# Patient Record
Sex: FEMALE | Race: WHITE | NOT HISPANIC OR LATINO | Employment: OTHER | URBAN - METROPOLITAN AREA
[De-identification: names, ages, dates, MRNs, and addresses within clinical notes are randomized per-mention and may not be internally consistent; named-entity substitution may affect disease eponyms.]

---

## 2023-02-03 PROBLEM — R92.8 ABNORMAL MAMMOGRAM OF RIGHT BREAST: Status: ACTIVE | Noted: 2023-02-03

## 2023-02-03 PROBLEM — I10 HYPERTENSION: Status: ACTIVE | Noted: 2023-02-03

## 2023-02-03 PROBLEM — R94.31 ABNORMAL ELECTROCARDIOGRAPHY: Status: ACTIVE | Noted: 2023-02-03

## 2023-02-03 PROBLEM — E78.5 HYPERLIPIDEMIA: Status: ACTIVE | Noted: 2023-02-03

## 2023-02-03 PROBLEM — J20.9 BRONCHITIS, ACUTE: Status: ACTIVE | Noted: 2023-02-03

## 2023-02-03 PROBLEM — J01.90 ACUTE BACTERIAL SINUSITIS: Status: ACTIVE | Noted: 2023-02-03

## 2023-02-03 PROBLEM — M17.12 ARTHRITIS OF KNEE, LEFT: Status: ACTIVE | Noted: 2023-02-03

## 2023-02-03 PROBLEM — M25.572 LEFT ANKLE PAIN: Status: ACTIVE | Noted: 2023-02-03

## 2023-02-03 PROBLEM — R05.8 COUGH DUE TO ACE INHIBITOR: Status: ACTIVE | Noted: 2023-02-03

## 2023-02-03 PROBLEM — R00.1 BRADYCARDIA, SINUS: Status: ACTIVE | Noted: 2023-02-03

## 2023-02-03 PROBLEM — B96.89 ACUTE BACTERIAL SINUSITIS: Status: ACTIVE | Noted: 2023-02-03

## 2023-02-03 PROBLEM — M10.9 GOUT: Status: ACTIVE | Noted: 2023-02-03

## 2023-02-03 PROBLEM — I65.29 CAROTID STENOSIS: Status: ACTIVE | Noted: 2023-02-03

## 2023-02-03 PROBLEM — F41.9 ANXIETY: Status: ACTIVE | Noted: 2023-02-03

## 2023-02-03 PROBLEM — S82.852A CLOSED TRIMALLEOLAR FRACTURE OF LEFT ANKLE: Status: ACTIVE | Noted: 2023-02-03

## 2023-02-03 PROBLEM — M25.562 LEFT KNEE PAIN: Status: ACTIVE | Noted: 2023-02-03

## 2023-02-03 PROBLEM — E03.9 ADULT HYPOTHYROIDISM: Status: ACTIVE | Noted: 2023-02-03

## 2023-02-03 PROBLEM — R92.1 BREAST CALCIFICATION, RIGHT: Status: ACTIVE | Noted: 2023-02-03

## 2023-02-03 PROBLEM — L82.1 SEBORRHEIC KERATOSES: Status: ACTIVE | Noted: 2023-02-03

## 2023-02-03 PROBLEM — T46.4X5A COUGH DUE TO ACE INHIBITOR: Status: ACTIVE | Noted: 2023-02-03

## 2023-02-03 RX ORDER — LEVOTHYROXINE SODIUM 25 UG/1
1 TABLET ORAL DAILY
COMMUNITY
Start: 2014-11-26 | End: 2023-10-30

## 2023-02-03 RX ORDER — ALLOPURINOL 100 MG/1
2 TABLET ORAL DAILY
COMMUNITY
Start: 2014-08-04 | End: 2023-04-21

## 2023-02-03 RX ORDER — VERAPAMIL HYDROCHLORIDE 120 MG/1
1 TABLET, FILM COATED, EXTENDED RELEASE ORAL DAILY
COMMUNITY
Start: 2014-08-18 | End: 2023-09-25

## 2023-02-03 RX ORDER — ASPIRIN 81 MG/1
1 TABLET ORAL DAILY
COMMUNITY
End: 2024-01-30 | Stop reason: WASHOUT

## 2023-02-03 RX ORDER — ATENOLOL 50 MG/1
TABLET ORAL DAILY
COMMUNITY
Start: 2014-10-27 | End: 2023-08-04

## 2023-02-03 RX ORDER — ALPRAZOLAM 0.5 MG/1
1 TABLET ORAL
COMMUNITY
Start: 2014-10-27 | End: 2023-03-17 | Stop reason: SDUPTHER

## 2023-03-17 ENCOUNTER — OFFICE VISIT (OUTPATIENT)
Dept: PRIMARY CARE | Facility: CLINIC | Age: 83
End: 2023-03-17
Payer: MEDICARE

## 2023-03-17 VITALS
SYSTOLIC BLOOD PRESSURE: 120 MMHG | BODY MASS INDEX: 23.29 KG/M2 | WEIGHT: 139.8 LBS | DIASTOLIC BLOOD PRESSURE: 82 MMHG | HEIGHT: 65 IN | TEMPERATURE: 98.2 F

## 2023-03-17 DIAGNOSIS — E03.9 ADULT HYPOTHYROIDISM: ICD-10-CM

## 2023-03-17 DIAGNOSIS — F41.9 ANXIETY: Primary | ICD-10-CM

## 2023-03-17 DIAGNOSIS — I10 HYPERTENSION, UNSPECIFIED TYPE: ICD-10-CM

## 2023-03-17 PROCEDURE — 1036F TOBACCO NON-USER: CPT | Performed by: FAMILY MEDICINE

## 2023-03-17 PROCEDURE — 3074F SYST BP LT 130 MM HG: CPT | Performed by: FAMILY MEDICINE

## 2023-03-17 PROCEDURE — 1159F MED LIST DOCD IN RCRD: CPT | Performed by: FAMILY MEDICINE

## 2023-03-17 PROCEDURE — 3079F DIAST BP 80-89 MM HG: CPT | Performed by: FAMILY MEDICINE

## 2023-03-17 PROCEDURE — 99213 OFFICE O/P EST LOW 20 MIN: CPT | Performed by: FAMILY MEDICINE

## 2023-03-17 RX ORDER — HYDROCORTISONE 25 MG/G
OINTMENT TOPICAL 2 TIMES DAILY
COMMUNITY
Start: 2019-11-22 | End: 2023-12-05 | Stop reason: ALTCHOICE

## 2023-03-17 RX ORDER — ATORVASTATIN CALCIUM 20 MG/1
20 TABLET, FILM COATED ORAL DAILY
COMMUNITY
End: 2023-12-18 | Stop reason: WASHOUT

## 2023-03-17 RX ORDER — ALPRAZOLAM 0.5 MG/1
0.5 TABLET ORAL 2 TIMES DAILY
Qty: 60 TABLET | Refills: 2 | Status: SHIPPED | OUTPATIENT
Start: 2023-03-17 | End: 2023-06-19 | Stop reason: SDUPTHER

## 2023-03-17 RX ORDER — BETAMETHASONE DIPROPIONATE 0.5 MG/G
CREAM TOPICAL
COMMUNITY
Start: 2015-07-31 | End: 2023-12-05 | Stop reason: ALTCHOICE

## 2023-03-17 ASSESSMENT — ENCOUNTER SYMPTOMS
CONSTITUTIONAL NEGATIVE: 1
SLEEP DISTURBANCE: 1
NERVOUS/ANXIOUS: 1

## 2023-03-17 ASSESSMENT — PAIN SCALES - GENERAL: PAINLEVEL: 0-NO PAIN

## 2023-03-17 NOTE — PROGRESS NOTES
"Subjective   Patient ID: Giulia Dai is a 82 y.o. female who presents for Med Refill (Anxiety med refills).    Med Refill         Review of Systems   Constitutional: Negative.    Cardiovascular:         Dr Morrow   Genitourinary:         Seeing nephrology   Psychiatric/Behavioral:  Positive for sleep disturbance. The patient is nervous/anxious.        Objective   /82 (BP Location: Left arm)   Temp 36.8 °C (98.2 °F) (Temporal)   Ht 1.651 m (5' 5\")   Wt 63.4 kg (139 lb 12.8 oz)   BMI 23.26 kg/m²     Physical Exam  Vitals reviewed.   Cardiovascular:      Rate and Rhythm: Regular rhythm.   Pulmonary:      Breath sounds: Normal breath sounds.   Neurological:      General: No focal deficit present.   Psychiatric:         Mood and Affect: Mood normal.         Behavior: Behavior normal.         Thought Content: Thought content normal.         Judgment: Judgment normal.         Assessment/Plan   Problem List Items Addressed This Visit          Circulatory    Hypertension       Endocrine/Metabolic    Adult hypothyroidism       Other    Anxiety - Primary          "

## 2023-03-17 NOTE — PROGRESS NOTES
Subjective   Patient ID: Giulia Dai is a 82 y.o. female who presents for No chief complaint on file..   3 month med refills  HPI     Review of Systems    Objective   There were no vitals taken for this visit.    Physical Exam    Assessment/Plan   Problem List Items Addressed This Visit          Circulatory    Hypertension       Endocrine/Metabolic    Adult hypothyroidism       Other    Anxiety - Primary        OARRS:  No data recorded  I have personally reviewed the OARRS report for Giulia Dai. I have considered the risks of abuse, dependence, addiction and diversion    Is the patient prescribed a combination of a benzodiazepine and opioid?  Yes, I feel it is clincially indicated to continue the medication and have discussed with the patient risks/benefits/alternatives.    Last Urine Drug Screen / ordered today: Yes  Recent Results (from the past 46751 hour(s))   Benzodiazepine Confirmation, Urine    Collection Time: 03/11/22  1:10 PM   Result Value Ref Range    7-Aminoclonazepam <25 Cutoff <25 ng/mL    Alpha-Hydroxyalprazolam 180 (A) Cutoff <25 ng/mL    Alpha-Hydroxymidazolam <25 Cutoff <25 ng/mL    Alprazolam 36 (A) Cutoff <25 ng/mL    Chlordiazepoxide <25 Cutoff <25 ng/mL    Clonazepam <25 Cutoff <25 ng/mL    Diazepam <25 Cutoff <25 ng/mL    Lorazepam <25 Cutoff <25 ng/mL    Midazolam <25 Cutoff <25 ng/mL    Nordiazepam <25 Cutoff <25 ng/mL    Oxazepam <25 Cutoff <25 ng/mL    Temazepam <25 Cutoff <25 ng/mL   Drug Screen, Urine With Reflex to Confirmation    Collection Time: 03/11/22  1:10 PM   Result Value Ref Range    DRUG SCREEN COMMENT URINE SEE BELOW     Amphetamine Screen, Urine PRESUMPTIVE NEGATIVE NEGATIVE    Barbiturate Screen, Urine PRESUMPTIVE NEGATIVE NEGATIVE    BENZODIAZEPINE (PRESENCE) IN URINE BY SCREEN METHOD PRESUMPTIVE POSITIVE (A) NEGATIVE    Cannabinoid Screen, Urine PRESUMPTIVE NEGATIVE NEGATIVE    Cocaine Screen, Urine PRESUMPTIVE NEGATIVE NEGATIVE    Fentanyl, Ur PRESUMPTIVE  NEGATIVE NEGATIVE    Methadone Screen, Urine PRESUMPTIVE NEGATIVE NEGATIVE    Opiate Screen, Urine PRESUMPTIVE NEGATIVE NEGATIVE    Oxycodone Screen, Ur PRESUMPTIVE NEGATIVE NEGATIVE    PCP Screen, Urine PRESUMPTIVE NEGATIVE NEGATIVE     Results are as expected.     Controlled Substance Agreement:  Date of the Last Agreement: 3/11/22  Reviewed Controlled Substance Agreement including but not limited to the benefits, risks, and alternatives to treatment with a Controlled Substance medication(s).    Benzodiazepines:  What is the patient's goal of therapy? anxiety  Is this being achieved with current treatment? yes    SAL-7:  No data recorded    Activities of Daily Living:   Is your overall impression that this patient is benefiting (symptom reduction outweighs side effects) from benzodiazepine therapy? Yes     1. Physical Functioning: Better  2. Family Relationship: Better  3. Social Relationship: Better  4. Mood: Better  5. Sleep Patterns: Better  6. Overall Function: Better

## 2023-04-21 DIAGNOSIS — M10.9 GOUT, UNSPECIFIED: ICD-10-CM

## 2023-04-21 RX ORDER — ALLOPURINOL 100 MG/1
TABLET ORAL
Qty: 180 TABLET | Refills: 3 | Status: SHIPPED | OUTPATIENT
Start: 2023-04-21 | End: 2024-02-27

## 2023-06-19 ENCOUNTER — OFFICE VISIT (OUTPATIENT)
Dept: PRIMARY CARE | Facility: CLINIC | Age: 83
End: 2023-06-19
Payer: MEDICARE

## 2023-06-19 ENCOUNTER — LAB (OUTPATIENT)
Dept: LAB | Facility: LAB | Age: 83
End: 2023-06-19
Payer: MEDICARE

## 2023-06-19 VITALS
WEIGHT: 140 LBS | SYSTOLIC BLOOD PRESSURE: 120 MMHG | HEIGHT: 66 IN | TEMPERATURE: 97.1 F | DIASTOLIC BLOOD PRESSURE: 70 MMHG | BODY MASS INDEX: 22.5 KG/M2

## 2023-06-19 DIAGNOSIS — Z51.81 MEDICATION MONITORING ENCOUNTER: ICD-10-CM

## 2023-06-19 DIAGNOSIS — I10 HYPERTENSION, UNSPECIFIED TYPE: Primary | ICD-10-CM

## 2023-06-19 DIAGNOSIS — F41.9 ANXIETY: ICD-10-CM

## 2023-06-19 LAB
AMPHETAMINE (PRESENCE) IN URINE BY SCREEN METHOD: ABNORMAL
BARBITURATES PRESENCE IN URINE BY SCREEN METHOD: ABNORMAL
BENZODIAZEPINE (PRESENCE) IN URINE BY SCREEN METHOD: ABNORMAL
CANNABINOIDS IN URINE BY SCREEN METHOD: ABNORMAL
COCAINE (PRESENCE) IN URINE BY SCREEN METHOD: ABNORMAL
DRUG SCREEN COMMENT URINE: ABNORMAL
FENTANYL URINE: ABNORMAL
METHADONE (PRESENCE) IN URINE BY SCREEN METHOD: ABNORMAL
OPIATES (PRESENCE) IN URINE BY SCREEN METHOD: ABNORMAL
OXYCODONE (PRESENCE) IN URINE BY SCREEN METHOD: ABNORMAL
PHENCYCLIDINE (PRESENCE) IN URINE BY SCREEN METHOD: ABNORMAL

## 2023-06-19 PROCEDURE — 1160F RVW MEDS BY RX/DR IN RCRD: CPT | Performed by: FAMILY MEDICINE

## 2023-06-19 PROCEDURE — 99213 OFFICE O/P EST LOW 20 MIN: CPT | Performed by: FAMILY MEDICINE

## 2023-06-19 PROCEDURE — 1036F TOBACCO NON-USER: CPT | Performed by: FAMILY MEDICINE

## 2023-06-19 PROCEDURE — 3074F SYST BP LT 130 MM HG: CPT | Performed by: FAMILY MEDICINE

## 2023-06-19 PROCEDURE — 80307 DRUG TEST PRSMV CHEM ANLYZR: CPT

## 2023-06-19 PROCEDURE — 80346 BENZODIAZEPINES1-12: CPT

## 2023-06-19 PROCEDURE — 3078F DIAST BP <80 MM HG: CPT | Performed by: FAMILY MEDICINE

## 2023-06-19 PROCEDURE — 1159F MED LIST DOCD IN RCRD: CPT | Performed by: FAMILY MEDICINE

## 2023-06-19 RX ORDER — ALPRAZOLAM 0.5 MG/1
0.5 TABLET ORAL 2 TIMES DAILY
Qty: 60 TABLET | Refills: 2 | Status: SHIPPED | OUTPATIENT
Start: 2023-06-19 | End: 2023-09-18 | Stop reason: SDUPTHER

## 2023-06-19 ASSESSMENT — ENCOUNTER SYMPTOMS
LOSS OF SENSATION IN FEET: 0
CONSTITUTIONAL NEGATIVE: 1
NERVOUS/ANXIOUS: 1
DEPRESSION: 0
OCCASIONAL FEELINGS OF UNSTEADINESS: 0

## 2023-06-19 ASSESSMENT — PAIN SCALES - GENERAL: PAINLEVEL: 0-NO PAIN

## 2023-06-19 NOTE — PROGRESS NOTES
"Subjective   Patient ID: Giulia Dai is a 83 y.o. female who presents for Med Refill (Follow up anxiety med refills).   3 month med refills  HPI     Review of Systems   Constitutional: Negative.    Psychiatric/Behavioral:  The patient is nervous/anxious.        Objective   /70 (BP Location: Left arm)   Temp 36.2 °C (97.1 °F) (Temporal)   Ht 1.676 m (5' 6\")   Wt 63.5 kg (140 lb)   BMI 22.60 kg/m²     Physical Exam  Vitals and nursing note reviewed.   Neurological:      General: No focal deficit present.      Mental Status: She is alert and oriented to person, place, and time.   Psychiatric:         Behavior: Behavior normal.         Assessment/Plan   Problem List Items Addressed This Visit          Circulatory    Hypertension - Primary       Other    Anxiety    Relevant Medications    ALPRAZolam (Xanax) 0.5 mg tablet        OARRS:  Alvin Langston,  on 6/19/2023  2:17 PM  I have personally reviewed the OARRS report for Giulia Dai. I have considered the risks of abuse, dependence, addiction and diversion    Is the patient prescribed a combination of a benzodiazepine and opioid?  No    Last Urine Drug Screen / ordered today: Yes  Recent Results (from the past 50645 hour(s))   Benzodiazepine Confirmation, Urine    Collection Time: 03/11/22  1:10 PM   Result Value Ref Range    7-Aminoclonazepam <25 Cutoff <25 ng/mL    Alpha-Hydroxyalprazolam 180 (A) Cutoff <25 ng/mL    Alpha-Hydroxymidazolam <25 Cutoff <25 ng/mL    Alprazolam 36 (A) Cutoff <25 ng/mL    Chlordiazepoxide <25 Cutoff <25 ng/mL    Clonazepam <25 Cutoff <25 ng/mL    Diazepam <25 Cutoff <25 ng/mL    Lorazepam <25 Cutoff <25 ng/mL    Midazolam <25 Cutoff <25 ng/mL    Nordiazepam <25 Cutoff <25 ng/mL    Oxazepam <25 Cutoff <25 ng/mL    Temazepam <25 Cutoff <25 ng/mL   Drug Screen, Urine With Reflex to Confirmation    Collection Time: 03/11/22  1:10 PM   Result Value Ref Range    DRUG SCREEN COMMENT URINE SEE BELOW     Amphetamine " Screen, Urine PRESUMPTIVE NEGATIVE NEGATIVE    Barbiturate Screen, Urine PRESUMPTIVE NEGATIVE NEGATIVE    BENZODIAZEPINE (PRESENCE) IN URINE BY SCREEN METHOD PRESUMPTIVE POSITIVE (A) NEGATIVE    Cannabinoid Screen, Urine PRESUMPTIVE NEGATIVE NEGATIVE    Cocaine Screen, Urine PRESUMPTIVE NEGATIVE NEGATIVE    Fentanyl, Ur PRESUMPTIVE NEGATIVE NEGATIVE    Methadone Screen, Urine PRESUMPTIVE NEGATIVE NEGATIVE    Opiate Screen, Urine PRESUMPTIVE NEGATIVE NEGATIVE    Oxycodone Screen, Ur PRESUMPTIVE NEGATIVE NEGATIVE    PCP Screen, Urine PRESUMPTIVE NEGATIVE NEGATIVE     N/A    Controlled Substance Agreement:  Date of the Last Agreement: 16/19/23  Reviewed Controlled Substance Agreement including but not limited to the benefits, risks, and alternatives to treatment with a Controlled Substance medication(s).    Benzodiazepines:  What is the patient's goal of therapy? anxiety  Is this being achieved with current treatment? y    SAL-7:  No data recorded    Activities of Daily Living:   Is your overall impression that this patient is benefiting (symptom reduction outweighs side effects) from benzodiazepine therapy? Yes     1. Physical Functioning: Better  2. Family Relationship: Better  3. Social Relationship: Better  4. Mood: Better  5. Sleep Patterns: Better  6. Overall Function: Better

## 2023-06-22 LAB
7-AMINOCLONAZEPAM: <25 NG/ML
ALPHA-HYDROXYALPRAZOLAM: 568 NG/ML
ALPHA-HYDROXYMIDAZOLAM: <25 NG/ML
ALPRAZOLAM: 117 NG/ML
CHLORDIAZEPOXIDE: <25 NG/ML
CLONAZEPAM: <25 NG/ML
DIAZEPAM: <25 NG/ML
LORAZEPAM: <25 NG/ML
MIDAZOLAM: <25 NG/ML
NORDIAZEPAM: <25 NG/ML
OXAZEPAM: <25 NG/ML
TEMAZEPAM: <25 NG/ML

## 2023-07-28 PROBLEM — N18.32 STAGE 3B CHRONIC KIDNEY DISEASE (MULTI): Status: ACTIVE | Noted: 2023-07-28

## 2023-07-28 PROBLEM — N20.0 NEPHROLITHIASIS: Status: ACTIVE | Noted: 2023-07-28

## 2023-07-28 PROBLEM — N17.9 ACUTE RENAL FAILURE SYNDROME (CMS-HCC): Status: ACTIVE | Noted: 2023-07-28

## 2023-07-28 PROBLEM — N25.81 HYPERPARATHYROIDISM DUE TO RENAL INSUFFICIENCY (MULTI): Status: ACTIVE | Noted: 2023-07-28

## 2023-07-28 RX ORDER — VALSARTAN AND HYDROCHLOROTHIAZIDE 80; 12.5 MG/1; MG/1
1 TABLET, FILM COATED ORAL
COMMUNITY

## 2023-07-28 RX ORDER — HYDROCODONE BITARTRATE AND ACETAMINOPHEN 5; 325 MG/1; MG/1
1 TABLET ORAL EVERY 4 HOURS PRN
COMMUNITY
Start: 2014-07-31 | End: 2023-09-18 | Stop reason: ALTCHOICE

## 2023-07-28 RX ORDER — IBUPROFEN 200 MG
TABLET ORAL EVERY 4 HOURS PRN
COMMUNITY
Start: 2014-07-31 | End: 2023-12-05 | Stop reason: ALTCHOICE

## 2023-07-28 RX ORDER — COLCHICINE 0.6 MG/1
TABLET ORAL
COMMUNITY
Start: 2014-07-31 | End: 2023-12-05 | Stop reason: ALTCHOICE

## 2023-08-04 DIAGNOSIS — I10 ESSENTIAL (PRIMARY) HYPERTENSION: ICD-10-CM

## 2023-08-04 RX ORDER — ATENOLOL 50 MG/1
50 TABLET ORAL DAILY
Qty: 90 TABLET | Refills: 3 | Status: SHIPPED | OUTPATIENT
Start: 2023-08-04

## 2023-08-09 LAB
ALBUMIN (G/DL) IN SER/PLAS: 4 G/DL (ref 3.4–5)
ALBUMIN (MG/L) IN URINE: 8.6 MG/L
ALBUMIN/CREATININE (UG/MG) IN URINE: 20.2 UG/MG CRT (ref 0–30)
ANION GAP IN SER/PLAS: 11 MMOL/L (ref 10–20)
CALCIDIOL (25 OH VITAMIN D3) (NG/ML) IN SER/PLAS: 21 NG/ML
CALCIUM (MG/DL) IN SER/PLAS: 10.3 MG/DL (ref 8.6–10.6)
CARBON DIOXIDE, TOTAL (MMOL/L) IN SER/PLAS: 27 MMOL/L (ref 21–32)
CHLORIDE (MMOL/L) IN SER/PLAS: 101 MMOL/L (ref 98–107)
CREATININE (MG/DL) IN SER/PLAS: 1.04 MG/DL (ref 0.5–1.05)
CREATININE (MG/DL) IN URINE: 42.6 MG/DL (ref 20–320)
GFR FEMALE: 53 ML/MIN/1.73M2
GLUCOSE (MG/DL) IN SER/PLAS: 84 MG/DL (ref 74–99)
PARATHYRIN INTACT (PG/ML) IN SER/PLAS: 125.7 PG/ML (ref 18.5–88)
PHOSPHATE (MG/DL) IN SER/PLAS: 3.7 MG/DL (ref 2.5–4.9)
POTASSIUM (MMOL/L) IN SER/PLAS: 4.1 MMOL/L (ref 3.5–5.3)
SODIUM (MMOL/L) IN SER/PLAS: 135 MMOL/L (ref 136–145)
UREA NITROGEN (MG/DL) IN SER/PLAS: 17 MG/DL (ref 6–23)

## 2023-09-18 ENCOUNTER — OFFICE VISIT (OUTPATIENT)
Dept: PRIMARY CARE | Facility: CLINIC | Age: 83
End: 2023-09-18
Payer: MEDICARE

## 2023-09-18 VITALS
HEIGHT: 66 IN | HEART RATE: 58 BPM | SYSTOLIC BLOOD PRESSURE: 142 MMHG | DIASTOLIC BLOOD PRESSURE: 74 MMHG | TEMPERATURE: 97.7 F | BODY MASS INDEX: 22.43 KG/M2 | WEIGHT: 139.6 LBS | OXYGEN SATURATION: 91 %

## 2023-09-18 DIAGNOSIS — I10 HYPERTENSION, UNSPECIFIED TYPE: Primary | ICD-10-CM

## 2023-09-18 DIAGNOSIS — N18.32 STAGE 3B CHRONIC KIDNEY DISEASE (MULTI): ICD-10-CM

## 2023-09-18 DIAGNOSIS — F41.9 ANXIETY: ICD-10-CM

## 2023-09-18 DIAGNOSIS — Z12.31 SCREENING MAMMOGRAM FOR BREAST CANCER: ICD-10-CM

## 2023-09-18 DIAGNOSIS — E03.9 ADULT HYPOTHYROIDISM: ICD-10-CM

## 2023-09-18 PROCEDURE — 3077F SYST BP >= 140 MM HG: CPT | Performed by: FAMILY MEDICINE

## 2023-09-18 PROCEDURE — 1036F TOBACCO NON-USER: CPT | Performed by: FAMILY MEDICINE

## 2023-09-18 PROCEDURE — 90662 IIV NO PRSV INCREASED AG IM: CPT | Performed by: FAMILY MEDICINE

## 2023-09-18 PROCEDURE — 3078F DIAST BP <80 MM HG: CPT | Performed by: FAMILY MEDICINE

## 2023-09-18 PROCEDURE — 99213 OFFICE O/P EST LOW 20 MIN: CPT | Performed by: FAMILY MEDICINE

## 2023-09-18 PROCEDURE — G0008 ADMIN INFLUENZA VIRUS VAC: HCPCS | Performed by: FAMILY MEDICINE

## 2023-09-18 PROCEDURE — 1160F RVW MEDS BY RX/DR IN RCRD: CPT | Performed by: FAMILY MEDICINE

## 2023-09-18 PROCEDURE — 1159F MED LIST DOCD IN RCRD: CPT | Performed by: FAMILY MEDICINE

## 2023-09-18 PROCEDURE — 1126F AMNT PAIN NOTED NONE PRSNT: CPT | Performed by: FAMILY MEDICINE

## 2023-09-18 RX ORDER — ALPRAZOLAM 0.5 MG/1
0.5 TABLET ORAL 2 TIMES DAILY
Qty: 60 TABLET | Refills: 2 | Status: SHIPPED | OUTPATIENT
Start: 2023-09-18 | End: 2023-12-18 | Stop reason: SDUPTHER

## 2023-09-18 SDOH — ECONOMIC STABILITY: FOOD INSECURITY: WITHIN THE PAST 12 MONTHS, THE FOOD YOU BOUGHT JUST DIDN'T LAST AND YOU DIDN'T HAVE MONEY TO GET MORE.: NEVER TRUE

## 2023-09-18 SDOH — ECONOMIC STABILITY: HOUSING INSECURITY
IN THE LAST 12 MONTHS, WAS THERE A TIME WHEN YOU DID NOT HAVE A STEADY PLACE TO SLEEP OR SLEPT IN A SHELTER (INCLUDING NOW)?: NO

## 2023-09-18 SDOH — ECONOMIC STABILITY: INCOME INSECURITY: IN THE LAST 12 MONTHS, WAS THERE A TIME WHEN YOU WERE NOT ABLE TO PAY THE MORTGAGE OR RENT ON TIME?: NO

## 2023-09-18 SDOH — ECONOMIC STABILITY: TRANSPORTATION INSECURITY
IN THE PAST 12 MONTHS, HAS THE LACK OF TRANSPORTATION KEPT YOU FROM MEDICAL APPOINTMENTS OR FROM GETTING MEDICATIONS?: NO

## 2023-09-18 SDOH — ECONOMIC STABILITY: TRANSPORTATION INSECURITY
IN THE PAST 12 MONTHS, HAS LACK OF TRANSPORTATION KEPT YOU FROM MEETINGS, WORK, OR FROM GETTING THINGS NEEDED FOR DAILY LIVING?: NO

## 2023-09-18 SDOH — ECONOMIC STABILITY: FOOD INSECURITY: WITHIN THE PAST 12 MONTHS, YOU WORRIED THAT YOUR FOOD WOULD RUN OUT BEFORE YOU GOT MONEY TO BUY MORE.: NEVER TRUE

## 2023-09-18 ASSESSMENT — LIFESTYLE VARIABLES
HOW MANY STANDARD DRINKS CONTAINING ALCOHOL DO YOU HAVE ON A TYPICAL DAY: PATIENT DOES NOT DRINK
SKIP TO QUESTIONS 9-10: 1
HOW OFTEN DO YOU HAVE SIX OR MORE DRINKS ON ONE OCCASION: NEVER
HOW OFTEN DO YOU HAVE A DRINK CONTAINING ALCOHOL: NEVER
AUDIT-C TOTAL SCORE: 0

## 2023-09-18 ASSESSMENT — SOCIAL DETERMINANTS OF HEALTH (SDOH)
IN THE PAST 12 MONTHS, HAS THE ELECTRIC, GAS, OIL, OR WATER COMPANY THREATENED TO SHUT OFF SERVICE IN YOUR HOME?: NO
WITHIN THE LAST YEAR, HAVE YOU BEEN HUMILIATED OR EMOTIONALLY ABUSED IN OTHER WAYS BY YOUR PARTNER OR EX-PARTNER?: NO
HOW HARD IS IT FOR YOU TO PAY FOR THE VERY BASICS LIKE FOOD, HOUSING, MEDICAL CARE, AND HEATING?: NOT HARD AT ALL
WITHIN THE LAST YEAR, HAVE YOU BEEN KICKED, HIT, SLAPPED, OR OTHERWISE PHYSICALLY HURT BY YOUR PARTNER OR EX-PARTNER?: NO
WITHIN THE LAST YEAR, HAVE YOU BEEN AFRAID OF YOUR PARTNER OR EX-PARTNER?: NO
WITHIN THE LAST YEAR, HAVE TO BEEN RAPED OR FORCED TO HAVE ANY KIND OF SEXUAL ACTIVITY BY YOUR PARTNER OR EX-PARTNER?: NO

## 2023-09-18 ASSESSMENT — ENCOUNTER SYMPTOMS
DEPRESSION: 0
LOSS OF SENSATION IN FEET: 0
OCCASIONAL FEELINGS OF UNSTEADINESS: 0
NERVOUS/ANXIOUS: 1
CONSTITUTIONAL NEGATIVE: 1

## 2023-09-18 ASSESSMENT — PATIENT HEALTH QUESTIONNAIRE - PHQ9
1. LITTLE INTEREST OR PLEASURE IN DOING THINGS: NOT AT ALL
SUM OF ALL RESPONSES TO PHQ9 QUESTIONS 1 & 2: 0
2. FEELING DOWN, DEPRESSED OR HOPELESS: NOT AT ALL

## 2023-09-18 ASSESSMENT — PAIN SCALES - GENERAL: PAINLEVEL: 0-NO PAIN

## 2023-09-18 NOTE — PROGRESS NOTES
"Subjective   Patient ID: Giulia Dai is a 83 y.o. female who presents for Follow-up (Follow up anxiety med refills).   3 month med refills  HPI     Review of Systems   Constitutional: Negative.    Psychiatric/Behavioral:  The patient is nervous/anxious.        Objective   /74 (BP Location: Left arm)   Pulse 58   Temp 36.5 °C (97.7 °F) (Temporal)   Ht 1.676 m (5' 6\")   Wt 63.3 kg (139 lb 9.6 oz)   SpO2 91%   BMI 22.53 kg/m²     Physical Exam  Vitals and nursing note reviewed.   Neurological:      Mental Status: She is oriented to person, place, and time.   Psychiatric:         Mood and Affect: Mood normal.         Behavior: Behavior normal.         Assessment/Plan patient seen here for follow-up on her anxiety medication.  We refilled her meds.  We are also giving her flu vaccine and requisition for her mammogram.  Problem List Items Addressed This Visit       Adult hypothyroidism    Anxiety    Relevant Medications    ALPRAZolam (Xanax) 0.5 mg tablet    Hypertension - Primary    Stage 3b chronic kidney disease (CMS/HCC)     Other Visit Diagnoses       Screening mammogram for breast cancer        Relevant Orders    BI mammo bilateral screening tomosynthesis             OARRS:  Alvin Langston DO on 9/18/2023 12:59 PM  I have personally reviewed the OARRS report for Giulia Dai. I have considered the risks of abuse, dependence, addiction and diversion    Is the patient prescribed a combination of a benzodiazepine and opioid?  No    Last Urine Drug Screen / ordered today: No  Recent Results (from the past 8760 hour(s))   Benzodiazepine Confirmation, Urine    Collection Time: 06/19/23  2:36 PM   Result Value Ref Range    7-Aminoclonazepam <25 Cutoff <25 ng/mL    Alpha-Hydroxyalprazolam 568 (A) Cutoff <25 ng/mL    Alpha-Hydroxymidazolam <25 Cutoff <25 ng/mL    Alprazolam 117 (A) Cutoff <25 ng/mL    Chlordiazepoxide <25 Cutoff <25 ng/mL    Clonazepam <25 Cutoff <25 ng/mL    Diazepam <25 Cutoff " <25 ng/mL    Lorazepam <25 Cutoff <25 ng/mL    Midazolam <25 Cutoff <25 ng/mL    Nordiazepam <25 Cutoff <25 ng/mL    Oxazepam <25 Cutoff <25 ng/mL    Temazepam <25 Cutoff <25 ng/mL   Drug Screen, Urine With Reflex to Confirmation    Collection Time: 06/19/23  2:36 PM   Result Value Ref Range    DRUG SCREEN COMMENT URINE SEE BELOW     Amphetamine Screen, Urine PRESUMPTIVE NEGATIVE NEGATIVE    Barbiturate Screen, Urine PRESUMPTIVE NEGATIVE NEGATIVE    BENZODIAZEPINE (PRESENCE) IN URINE BY SCREEN METHOD PRESUMPTIVE POSITIVE (A) NEGATIVE    Cannabinoid Screen, Urine PRESUMPTIVE NEGATIVE NEGATIVE    Cocaine Screen, Urine PRESUMPTIVE NEGATIVE NEGATIVE    Fentanyl, Ur PRESUMPTIVE NEGATIVE NEGATIVE    Methadone Screen, Urine PRESUMPTIVE NEGATIVE NEGATIVE    Opiate Screen, Urine PRESUMPTIVE NEGATIVE NEGATIVE    Oxycodone Screen, Ur PRESUMPTIVE NEGATIVE NEGATIVE    PCP Screen, Urine PRESUMPTIVE NEGATIVE NEGATIVE     Results are as expected. yes  Controlled Substance Agreement:  Date of the Last Agreement: 6/19/23  Reviewed Controlled Substance Agreement including but not limited to the benefits, risks, and alternatives to treatment with a Controlled Substance medication(s).    Benzodiazepines:  What is the patient's goal of therapy? anxiety  Is this being achieved with current treatment? yes    SAL-7:  No data recorded    Activities of Daily Living:   Is your overall impression that this patient is benefiting (symptom reduction outweighs side effects) from benzodiazepine therapy? Yes     1. Physical Functioning: Better  2. Family Relationship: Better  3. Social Relationship: Better  4. Mood: Better  5. Sleep Patterns: Better  6. Overall Function: Better

## 2023-09-25 DIAGNOSIS — I10 ESSENTIAL (PRIMARY) HYPERTENSION: ICD-10-CM

## 2023-09-25 RX ORDER — VERAPAMIL HYDROCHLORIDE 120 MG/1
120 TABLET, FILM COATED, EXTENDED RELEASE ORAL DAILY
Qty: 90 TABLET | Refills: 2 | Status: SHIPPED | OUTPATIENT
Start: 2023-09-25

## 2023-10-02 ENCOUNTER — APPOINTMENT (OUTPATIENT)
Dept: RADIOLOGY | Facility: CLINIC | Age: 83
End: 2023-10-02
Payer: MEDICARE

## 2023-10-30 DIAGNOSIS — E03.9 HYPOTHYROIDISM, UNSPECIFIED: ICD-10-CM

## 2023-10-30 RX ORDER — LEVOTHYROXINE SODIUM 25 UG/1
25 TABLET ORAL DAILY
Qty: 90 TABLET | Refills: 3 | Status: SHIPPED | OUTPATIENT
Start: 2023-10-30

## 2023-12-04 PROBLEM — Z87.448 HISTORY OF RENAL INSUFFICIENCY SYNDROME: Status: ACTIVE | Noted: 2023-07-28

## 2023-12-05 ENCOUNTER — HOSPITAL ENCOUNTER (OUTPATIENT)
Dept: CARDIOLOGY | Facility: CLINIC | Age: 83
Discharge: HOME | End: 2023-12-05
Payer: MEDICARE

## 2023-12-05 ENCOUNTER — OFFICE VISIT (OUTPATIENT)
Dept: CARDIOLOGY | Facility: CLINIC | Age: 83
End: 2023-12-05
Payer: MEDICARE

## 2023-12-05 VITALS
OXYGEN SATURATION: 96 % | SYSTOLIC BLOOD PRESSURE: 155 MMHG | DIASTOLIC BLOOD PRESSURE: 95 MMHG | BODY MASS INDEX: 22.89 KG/M2 | HEART RATE: 98 BPM | WEIGHT: 141.8 LBS

## 2023-12-05 DIAGNOSIS — I47.19 ATRIAL TACHYCARDIA (CMS-HCC): ICD-10-CM

## 2023-12-05 DIAGNOSIS — Z87.448 HISTORY OF RENAL INSUFFICIENCY SYNDROME: ICD-10-CM

## 2023-12-05 DIAGNOSIS — E78.00 PURE HYPERCHOLESTEROLEMIA: ICD-10-CM

## 2023-12-05 DIAGNOSIS — I65.29 STENOSIS OF CAROTID ARTERY, UNSPECIFIED LATERALITY: ICD-10-CM

## 2023-12-05 DIAGNOSIS — I10 HYPERTENSION, UNSPECIFIED TYPE: Primary | ICD-10-CM

## 2023-12-05 PROCEDURE — 3077F SYST BP >= 140 MM HG: CPT | Performed by: INTERNAL MEDICINE

## 2023-12-05 PROCEDURE — 1159F MED LIST DOCD IN RCRD: CPT | Performed by: INTERNAL MEDICINE

## 2023-12-05 PROCEDURE — 93000 ELECTROCARDIOGRAM COMPLETE: CPT | Performed by: INTERNAL MEDICINE

## 2023-12-05 PROCEDURE — 1126F AMNT PAIN NOTED NONE PRSNT: CPT | Performed by: INTERNAL MEDICINE

## 2023-12-05 PROCEDURE — 99214 OFFICE O/P EST MOD 30 MIN: CPT | Performed by: INTERNAL MEDICINE

## 2023-12-05 PROCEDURE — 1160F RVW MEDS BY RX/DR IN RCRD: CPT | Performed by: INTERNAL MEDICINE

## 2023-12-05 PROCEDURE — 3080F DIAST BP >= 90 MM HG: CPT | Performed by: INTERNAL MEDICINE

## 2023-12-05 PROCEDURE — 1036F TOBACCO NON-USER: CPT | Performed by: INTERNAL MEDICINE

## 2023-12-05 NOTE — PROGRESS NOTES
Chief Complaint:   Follow-up (6 month/Current BP on watch at visit is 128/75 HR 65)     History Of Present Illness:    Giulia Dai is a 83 y.o. female presenting with CV dz.  Monitoring BP/pulse at home-normally good  No palpitations    Patient denies chest pain/SOB/dizziness/lightheadedness/edema/claudication    Active around the home           Last Recorded Vitals:  Vitals:    12/05/23 1328 12/05/23 1351   BP: (!) 156/100 (!) 155/95   BP Location: Left arm    Patient Position: Sitting    Pulse: 98    SpO2: 96%    Weight: 64.3 kg (141 lb 12.8 oz)             Allergies:  Patient has no known allergies.    Outpatient Medications:  Current Outpatient Medications   Medication Instructions    allopurinol (Zyloprim) 100 mg tablet TAKE 2 TABLETS DAILY    ALPRAZolam (XANAX) 0.5 mg, oral, 2 times daily, 2 times daily PRN    aspirin 81 mg EC tablet 1 tablet, oral, Daily    atenolol (TENORMIN) 50 mg, oral, Daily    atorvastatin (LIPITOR) 20 mg, oral, Daily    levothyroxine (SYNTHROID, LEVOXYL) 25 mcg, oral, Daily    valsartan-hydrochlorothiazide (Diovan-HCT) 80-12.5 mg tablet 1 tablet, oral, Daily RT    verapamil SR (CALAN-SR) 120 mg, oral, Daily       Physical Exam:  Constitutional:       General: Awake.      Appearance: Healthy appearance. Not in distress.   Neck:      Vascular: No JVR. JVD normal.   Pulmonary:      Effort: Pulmonary effort is normal.      Breath sounds: Normal breath sounds. No wheezing. No rhonchi. No rales.   Chest:      Chest wall: Not tender to palpatation.   Cardiovascular:      PMI at left midclavicular line. Normal rate. Occasional ectopic beats. Regular rhythm. Normal S1. Normal S2.       Murmurs: There is no murmur.      No gallop.  No click. No rub.   Pulses:     Intact distal pulses.   Edema:     Peripheral edema absent.   Abdominal:      General: Bowel sounds are normal.      Palpations: Abdomen is soft.      Tenderness: There is no abdominal tenderness.   Musculoskeletal: Normal range of  "motion.         General: No tenderness. Skin:     General: Skin is warm and dry.   Neurological:      General: No focal deficit present.      Mental Status: Alert and oriented to person, place and time.          Last Labs:  CBC -  Lab Results   Component Value Date    WBC 8.6 02/07/2022    HGB 11.6 (L) 02/07/2022    HCT 36.1 02/07/2022    MCV 96 02/07/2022     02/07/2022       CMP -  Lab Results   Component Value Date    CALCIUM 10.3 08/09/2023    PHOS 3.7 08/09/2023    PROT 6.9 11/23/2021    ALBUMIN 4.0 08/09/2023    AST 17 11/23/2021    ALT 13 11/23/2021    ALKPHOS 126 11/23/2021    BILITOT 0.8 11/23/2021       LIPID PANEL -   Lab Results   Component Value Date    CHOL 119 11/23/2021    TRIG 132 11/23/2021    HDL 31.4 (A) 11/23/2021    CHHDL 3.8 11/23/2021    LDLF 61 11/23/2021    VLDL 26 11/23/2021    NHDL 125 11/07/2018       RENAL FUNCTION PANEL -   Lab Results   Component Value Date    GLUCOSE 84 08/09/2023     (L) 08/09/2023    K 4.1 08/09/2023     08/09/2023    CO2 27 08/09/2023    ANIONGAP 11 08/09/2023    BUN 17 08/09/2023    CREATININE 1.04 08/09/2023    CALCIUM 10.3 08/09/2023    PHOS 3.7 08/09/2023    ALBUMIN 4.0 08/09/2023        No results found for: \"BNP\", \"HGBA1C\"        Lab review: I have personally reviewed the laboratory result(s)       Problem List Items Addressed This Visit       Carotid stenosis    Overview     Pt with mild dz per 2012 duplex. 2/2019 duplex with less than 50% stenosis. No bruit. Follow.         Hyperlipidemia    Overview     Statin Dc'd by Renal. No definite indication for one at this time.         Hypertension - Primary    Overview     BP elevated in the office but generally OK at home. No medication changes made.   Note: SBP does not go above 130 at home. Suspect she has WC HTN.         Relevant Orders    ECG 12 Lead (Completed)    History of renal insufficiency syndrome    Overview     11/23/2021 Cr=3.68   12/2020 Cr=1.65   Stopped valsartan-HCTZ   2/2022 " "Cr=1   8/2022 Cr=1.07   8/2023 Cr=1.04         Atrial tachycardia    Overview     2:1 atrial tach noted on EKG today-asymptomatic  Pt regularly checks pulse at home and it is \"Ok\"  Check EM              Holter monitor=48 hours=atrial tachycardia        Colt Morrow, DO  "

## 2023-12-06 ENCOUNTER — ANCILLARY PROCEDURE (OUTPATIENT)
Dept: RADIOLOGY | Facility: CLINIC | Age: 83
End: 2023-12-06
Payer: MEDICARE

## 2023-12-06 DIAGNOSIS — Z12.31 SCREENING MAMMOGRAM FOR BREAST CANCER: ICD-10-CM

## 2023-12-06 PROCEDURE — 77063 BREAST TOMOSYNTHESIS BI: CPT

## 2023-12-06 PROCEDURE — 77067 SCR MAMMO BI INCL CAD: CPT

## 2023-12-06 PROCEDURE — 77067 SCR MAMMO BI INCL CAD: CPT | Mod: BILATERAL PROCEDURE | Performed by: STUDENT IN AN ORGANIZED HEALTH CARE EDUCATION/TRAINING PROGRAM

## 2023-12-06 PROCEDURE — 77063 BREAST TOMOSYNTHESIS BI: CPT | Mod: BILATERAL PROCEDURE | Performed by: STUDENT IN AN ORGANIZED HEALTH CARE EDUCATION/TRAINING PROGRAM

## 2023-12-11 ENCOUNTER — HOSPITAL ENCOUNTER (OUTPATIENT)
Dept: CARDIOLOGY | Facility: CLINIC | Age: 83
Discharge: HOME | End: 2023-12-11
Payer: MEDICARE

## 2023-12-11 PROCEDURE — 93225 XTRNL ECG REC<48 HRS REC: CPT

## 2023-12-11 PROCEDURE — 93227 XTRNL ECG REC<48 HR R&I: CPT | Performed by: INTERNAL MEDICINE

## 2023-12-12 DIAGNOSIS — R92.8 ABNORMALITY OF LEFT BREAST ON SCREENING MAMMOGRAM: Primary | ICD-10-CM

## 2023-12-18 ENCOUNTER — OFFICE VISIT (OUTPATIENT)
Dept: PRIMARY CARE | Facility: CLINIC | Age: 83
End: 2023-12-18
Payer: MEDICARE

## 2023-12-18 VITALS
BODY MASS INDEX: 22.21 KG/M2 | HEIGHT: 66 IN | DIASTOLIC BLOOD PRESSURE: 84 MMHG | SYSTOLIC BLOOD PRESSURE: 140 MMHG | TEMPERATURE: 97.8 F | WEIGHT: 138.2 LBS

## 2023-12-18 DIAGNOSIS — E78.5 HYPERLIPIDEMIA, UNSPECIFIED HYPERLIPIDEMIA TYPE: Primary | ICD-10-CM

## 2023-12-18 DIAGNOSIS — N18.32 STAGE 3B CHRONIC KIDNEY DISEASE (MULTI): ICD-10-CM

## 2023-12-18 DIAGNOSIS — F41.9 ANXIETY: ICD-10-CM

## 2023-12-18 DIAGNOSIS — I10 HYPERTENSION, UNSPECIFIED TYPE: ICD-10-CM

## 2023-12-18 PROCEDURE — 3079F DIAST BP 80-89 MM HG: CPT | Performed by: FAMILY MEDICINE

## 2023-12-18 PROCEDURE — 1036F TOBACCO NON-USER: CPT | Performed by: FAMILY MEDICINE

## 2023-12-18 PROCEDURE — 1159F MED LIST DOCD IN RCRD: CPT | Performed by: FAMILY MEDICINE

## 2023-12-18 PROCEDURE — 3077F SYST BP >= 140 MM HG: CPT | Performed by: FAMILY MEDICINE

## 2023-12-18 PROCEDURE — 99213 OFFICE O/P EST LOW 20 MIN: CPT | Performed by: FAMILY MEDICINE

## 2023-12-18 PROCEDURE — 1126F AMNT PAIN NOTED NONE PRSNT: CPT | Performed by: FAMILY MEDICINE

## 2023-12-18 PROCEDURE — 1160F RVW MEDS BY RX/DR IN RCRD: CPT | Performed by: FAMILY MEDICINE

## 2023-12-18 RX ORDER — ALPRAZOLAM 0.5 MG/1
0.5 TABLET ORAL 2 TIMES DAILY
Qty: 60 TABLET | Refills: 2 | Status: SHIPPED | OUTPATIENT
Start: 2023-12-18 | End: 2024-03-18 | Stop reason: SDUPTHER

## 2023-12-18 ASSESSMENT — ENCOUNTER SYMPTOMS
LOSS OF SENSATION IN FEET: 0
CARDIOVASCULAR NEGATIVE: 1
DEPRESSION: 0
CONSTITUTIONAL NEGATIVE: 1
OCCASIONAL FEELINGS OF UNSTEADINESS: 0
ARTHRALGIAS: 1
NERVOUS/ANXIOUS: 1

## 2023-12-18 ASSESSMENT — PAIN SCALES - GENERAL: PAINLEVEL: 0-NO PAIN

## 2023-12-18 NOTE — PROGRESS NOTES
"Anticipated Discharge Disposition: Home to [facility].    Action: This RN CM spoke with the patient about having and paying for personal care assistants through [employer] at $29.00 per hour.  The patient refuses to have them or pay for them, saying \"I just want to go home.  I can take care of myself.\"    Barriers to Discharge: The worry that the patient will fail at home or that he won't be safe without help and/or supervision.    Plan: Advise hospitalist during rounds today of the speech therapy cognitive eval done on 5/3 and ask if another speech therapy cognitive eval should be done or if psych will need to determine if the patient has capacity to make his own decisions.  " "Subjective   Patient ID: Giulia Dai is a 83 y.o. female who presents for Follow-up (3  month follow up meds).    HPI     Review of Systems   Constitutional: Negative.    Cardiovascular: Negative.    Musculoskeletal:  Positive for arthralgias.   Psychiatric/Behavioral:  The patient is nervous/anxious.        Objective   /84 (BP Location: Left arm)   Temp 36.6 °C (97.8 °F) (Temporal)   Ht 1.676 m (5' 6\")   Wt 62.7 kg (138 lb 3.2 oz)   BMI 22.31 kg/m²     Physical Exam  Vitals and nursing note reviewed.   Cardiovascular:      Rate and Rhythm: Regular rhythm.      Heart sounds: Normal heart sounds.   Pulmonary:      Breath sounds: Normal breath sounds.   Neurological:      General: No focal deficit present.      Mental Status: She is oriented to person, place, and time.   Psychiatric:         Mood and Affect: Mood normal.         Behavior: Behavior normal.       Assessment/Plan Patient seen for refill xanax she continues to do well. We will see her in 3 mos   Problem List Items Addressed This Visit             ICD-10-CM    Anxiety F41.9    Relevant Medications    ALPRAZolam (Xanax) 0.5 mg tablet    Hyperlipidemia - Primary E78.5    Hypertension I10    Stage 3b chronic kidney disease (CMS/HCC) N18.32          "

## 2023-12-26 ENCOUNTER — TELEPHONE (OUTPATIENT)
Dept: CARDIOLOGY | Facility: CLINIC | Age: 83
End: 2023-12-26
Payer: MEDICARE

## 2023-12-26 DIAGNOSIS — I48.91 ATRIAL FIBRILLATION, UNSPECIFIED TYPE (MULTI): ICD-10-CM

## 2023-12-26 NOTE — TELEPHONE ENCOUNTER
----- Message from Colt Morrow DO sent at 12/26/2023 10:41 AM EST -----  Regarding: FW:  Monitor with AFib/flutter-start Eliquis 5 mg twice daily and see soon  ----- Message -----  From: James C Ramicone, DO  Sent: 12/24/2023   7:15 AM EST  To: Colt Morrow DO

## 2024-01-16 ENCOUNTER — NURSE TRIAGE (OUTPATIENT)
Dept: PRIMARY CARE | Facility: CLINIC | Age: 84
End: 2024-01-16
Payer: MEDICARE

## 2024-01-30 ENCOUNTER — OFFICE VISIT (OUTPATIENT)
Dept: CARDIOLOGY | Facility: CLINIC | Age: 84
End: 2024-01-30
Payer: MEDICARE

## 2024-01-30 VITALS — WEIGHT: 140 LBS | DIASTOLIC BLOOD PRESSURE: 64 MMHG | BODY MASS INDEX: 22.6 KG/M2 | SYSTOLIC BLOOD PRESSURE: 128 MMHG

## 2024-01-30 DIAGNOSIS — R00.1 BRADYCARDIA, SINUS: Primary | ICD-10-CM

## 2024-01-30 DIAGNOSIS — I10 HYPERTENSION, UNSPECIFIED TYPE: ICD-10-CM

## 2024-01-30 DIAGNOSIS — E78.00 PURE HYPERCHOLESTEROLEMIA: ICD-10-CM

## 2024-01-30 DIAGNOSIS — Z87.448 HISTORY OF RENAL INSUFFICIENCY SYNDROME: ICD-10-CM

## 2024-01-30 DIAGNOSIS — I47.19 ATRIAL TACHYCARDIA (CMS-HCC): ICD-10-CM

## 2024-01-30 DIAGNOSIS — I48.0 PAROXYSMAL A-FIB (MULTI): ICD-10-CM

## 2024-01-30 DIAGNOSIS — I65.29 STENOSIS OF CAROTID ARTERY, UNSPECIFIED LATERALITY: ICD-10-CM

## 2024-01-30 PROCEDURE — 3078F DIAST BP <80 MM HG: CPT | Performed by: INTERNAL MEDICINE

## 2024-01-30 PROCEDURE — 99214 OFFICE O/P EST MOD 30 MIN: CPT | Performed by: INTERNAL MEDICINE

## 2024-01-30 PROCEDURE — 1126F AMNT PAIN NOTED NONE PRSNT: CPT | Performed by: INTERNAL MEDICINE

## 2024-01-30 PROCEDURE — 1159F MED LIST DOCD IN RCRD: CPT | Performed by: INTERNAL MEDICINE

## 2024-01-30 PROCEDURE — 1160F RVW MEDS BY RX/DR IN RCRD: CPT | Performed by: INTERNAL MEDICINE

## 2024-01-30 PROCEDURE — 3074F SYST BP LT 130 MM HG: CPT | Performed by: INTERNAL MEDICINE

## 2024-01-30 PROCEDURE — 1036F TOBACCO NON-USER: CPT | Performed by: INTERNAL MEDICINE

## 2024-01-30 PROCEDURE — 93000 ELECTROCARDIOGRAM COMPLETE: CPT | Performed by: INTERNAL MEDICINE

## 2024-01-30 NOTE — PROGRESS NOTES
Chief Complaint:   Follow-up     History Of Present Illness:    Giulia Dai is a 83 y.o. female presenting with CV dz.  Had EM since last visit  Feels well    Patient denies chest pain/SOB/dizziness/lightheadedness/edema/claudication    Active around the home  No bleeding with Eliquis  Thinks she has blurred vision with Eliquis           Last Recorded Vitals:  Vitals:    01/30/24 1521 01/30/24 1546   BP: 118/84 128/64   BP Location: Right arm    Patient Position: Sitting    BP Cuff Size: Adult    Weight: 63.5 kg (140 lb)             Allergies:  Patient has no known allergies.    Outpatient Medications:  Current Outpatient Medications   Medication Instructions    allopurinol (Zyloprim) 100 mg tablet TAKE 2 TABLETS DAILY    ALPRAZolam (XANAX) 0.5 mg, oral, 2 times daily, 2 times daily PRN    atenolol (TENORMIN) 50 mg, oral, Daily    levothyroxine (SYNTHROID, LEVOXYL) 25 mcg, oral, Daily    valsartan-hydrochlorothiazide (Diovan-HCT) 80-12.5 mg tablet 1 tablet, oral, Daily RT    verapamil SR (CALAN-SR) 120 mg, oral, Daily       Physical Exam:  Constitutional:       General: Awake.      Appearance: Healthy appearance. Not in distress.   Neck:      Vascular: No JVR. JVD normal.   Pulmonary:      Effort: Pulmonary effort is normal.      Breath sounds: Normal breath sounds. No wheezing. No rhonchi. No rales.   Chest:      Chest wall: Not tender to palpatation.   Cardiovascular:      PMI at left midclavicular line. Bradycardia present. Occasional ectopic beats. Regular rhythm. Normal S1. Normal S2.       Murmurs: There is no murmur.      No gallop.  No click. No rub.   Pulses:     Intact distal pulses.   Edema:     Peripheral edema absent.   Abdominal:      General: Bowel sounds are normal.      Palpations: Abdomen is soft.      Tenderness: There is no abdominal tenderness.   Musculoskeletal: Normal range of motion.         General: No tenderness. Skin:     General: Skin is warm and dry.   Neurological:      General: No  "focal deficit present.      Mental Status: Alert and oriented to person, place and time.          Last Labs:  CBC -  Lab Results   Component Value Date    WBC 8.6 02/07/2022    HGB 11.6 (L) 02/07/2022    HCT 36.1 02/07/2022    MCV 96 02/07/2022     02/07/2022       CMP -  Lab Results   Component Value Date    CALCIUM 10.3 08/09/2023    PHOS 3.7 08/09/2023    PROT 6.9 11/23/2021    ALBUMIN 4.0 08/09/2023    AST 17 11/23/2021    ALT 13 11/23/2021    ALKPHOS 126 11/23/2021    BILITOT 0.8 11/23/2021       LIPID PANEL -   Lab Results   Component Value Date    CHOL 119 11/23/2021    TRIG 132 11/23/2021    HDL 31.4 (A) 11/23/2021    CHHDL 3.8 11/23/2021    LDLF 61 11/23/2021    VLDL 26 11/23/2021    NHDL 125 11/07/2018       RENAL FUNCTION PANEL -   Lab Results   Component Value Date    GLUCOSE 84 08/09/2023     (L) 08/09/2023    K 4.1 08/09/2023     08/09/2023    CO2 27 08/09/2023    ANIONGAP 11 08/09/2023    BUN 17 08/09/2023    CREATININE 1.04 08/09/2023    CALCIUM 10.3 08/09/2023    PHOS 3.7 08/09/2023    ALBUMIN 4.0 08/09/2023        No results found for: \"BNP\", \"HGBA1C\"        Lab review: I have personally reviewed the laboratory result(s)       Problem List Items Addressed This Visit       Bradycardia, sinus - Primary    Overview     Borderline-stable         Carotid stenosis    Overview     Pt with mild dz per 2012 duplex. 2/2019 duplex with less than 50% stenosis. No bruit. Follow.         Hyperlipidemia    Overview     Statin Dc'd by Renal. No definite indication for one at this time.         Hypertension    Overview     BP well controlled on current meds         Relevant Orders    ECG 12 Lead (Completed)    History of renal insufficiency syndrome    Overview     11/23/2021 Cr=3.68   12/2020 Cr=1.65   Stopped valsartan-HCTZ   2/2022 Cr=1   8/2022 Cr=1.07   8/2023 Cr=1.04         Atrial tachycardia    Overview     2:1 atrial tach noted on EKG on 12/5/23 OV  Checked EM-had PAFIB-started  " Bertha  Believes Eliquis is causing blurred vision-change to Xarelto(low dose as Cr clearance=41)           Paroxysmal A-fib (CMS/HCC)    Overview     Noted on 12/2023 holter    In NSR today    Has [id number] score of at least 4 thus high risk for CE stroke-on AC    Note changing Eliquis to Xarelto(low dose as Cr clearance =41) as c/o blurred vision from Eliquis              Stop Eliquis-blurred vision  Start Xarelto once daily at dinner time=in place of Eliquis        Colt Morrow, DO

## 2024-02-22 ENCOUNTER — APPOINTMENT (OUTPATIENT)
Dept: CARDIOLOGY | Facility: CLINIC | Age: 84
End: 2024-02-22
Payer: MEDICARE

## 2024-02-27 DIAGNOSIS — M10.9 GOUT, UNSPECIFIED: ICD-10-CM

## 2024-02-27 RX ORDER — ALLOPURINOL 100 MG/1
200 TABLET ORAL DAILY
Qty: 180 TABLET | Refills: 3 | Status: SHIPPED | OUTPATIENT
Start: 2024-02-27

## 2024-03-18 ENCOUNTER — OFFICE VISIT (OUTPATIENT)
Dept: PRIMARY CARE | Facility: CLINIC | Age: 84
End: 2024-03-18
Payer: MEDICARE

## 2024-03-18 VITALS
BODY MASS INDEX: 23.01 KG/M2 | SYSTOLIC BLOOD PRESSURE: 112 MMHG | TEMPERATURE: 97.4 F | DIASTOLIC BLOOD PRESSURE: 82 MMHG | WEIGHT: 143.2 LBS | HEIGHT: 66 IN

## 2024-03-18 DIAGNOSIS — I48.0 PAROXYSMAL A-FIB (MULTI): Primary | ICD-10-CM

## 2024-03-18 DIAGNOSIS — N39.41 URGE INCONTINENCE: ICD-10-CM

## 2024-03-18 DIAGNOSIS — F41.9 ANXIETY: ICD-10-CM

## 2024-03-18 PROCEDURE — 1036F TOBACCO NON-USER: CPT | Performed by: FAMILY MEDICINE

## 2024-03-18 PROCEDURE — 3079F DIAST BP 80-89 MM HG: CPT | Performed by: FAMILY MEDICINE

## 2024-03-18 PROCEDURE — 3074F SYST BP LT 130 MM HG: CPT | Performed by: FAMILY MEDICINE

## 2024-03-18 PROCEDURE — 1159F MED LIST DOCD IN RCRD: CPT | Performed by: FAMILY MEDICINE

## 2024-03-18 PROCEDURE — 1160F RVW MEDS BY RX/DR IN RCRD: CPT | Performed by: FAMILY MEDICINE

## 2024-03-18 PROCEDURE — 99213 OFFICE O/P EST LOW 20 MIN: CPT | Performed by: FAMILY MEDICINE

## 2024-03-18 PROCEDURE — 1126F AMNT PAIN NOTED NONE PRSNT: CPT | Performed by: FAMILY MEDICINE

## 2024-03-18 RX ORDER — OXYBUTYNIN CHLORIDE 10 MG/1
10 TABLET, EXTENDED RELEASE ORAL DAILY
Qty: 90 TABLET | Refills: 3 | Status: SHIPPED | OUTPATIENT
Start: 2024-03-18 | End: 2025-03-18

## 2024-03-18 RX ORDER — ALPRAZOLAM 0.5 MG/1
0.5 TABLET ORAL 2 TIMES DAILY
Qty: 60 TABLET | Refills: 2 | Status: SHIPPED | OUTPATIENT
Start: 2024-03-18 | End: 2024-03-18 | Stop reason: SDUPTHER

## 2024-03-18 RX ORDER — ALPRAZOLAM 0.5 MG/1
0.5 TABLET ORAL 2 TIMES DAILY
Qty: 60 TABLET | Refills: 2 | Status: SHIPPED | OUTPATIENT
Start: 2024-03-18

## 2024-03-18 ASSESSMENT — ENCOUNTER SYMPTOMS
LOSS OF SENSATION IN FEET: 0
CONSTITUTIONAL NEGATIVE: 1
OCCASIONAL FEELINGS OF UNSTEADINESS: 0
DEPRESSION: 0
NERVOUS/ANXIOUS: 1
FREQUENCY: 1

## 2024-03-18 ASSESSMENT — PAIN SCALES - GENERAL: PAINLEVEL: 0-NO PAIN

## 2024-03-18 NOTE — PROGRESS NOTES
"Subjective   Patient ID: Giulia Dai is a 83 y.o. female who presents for Follow-up (Anxiety med refills).   3 month med refills  HPI     Review of Systems   Constitutional: Negative.    Genitourinary:  Positive for frequency.   Psychiatric/Behavioral:  The patient is nervous/anxious.        Objective   /82 (BP Location: Left arm)   Temp 36.3 °C (97.4 °F) (Temporal)   Ht 1.676 m (5' 6\")   Wt 65 kg (143 lb 3.2 oz)   BMI 23.11 kg/m²     Physical Exam  Vitals and nursing note reviewed.   Constitutional:       Appearance: Normal appearance.   Cardiovascular:      Rate and Rhythm: Regular rhythm.      Heart sounds: Normal heart sounds.   Neurological:      General: No focal deficit present.      Mental Status: She is alert and oriented to person, place, and time.   Psychiatric:         Mood and Affect: Mood normal.         Behavior: Behavior normal.         Assessment/Plan patient seen here for follow-up on her benzodiazepine we refilled that medication she is also having some problems with urge and stress incontinence.  We are starting her on Ditropan XL 10 mg a day she will let me know how she responds to that medication.  Problem List Items Addressed This Visit             ICD-10-CM    Anxiety F41.9    Relevant Medications    ALPRAZolam (Xanax) 0.5 mg tablet    Paroxysmal A-fib (CMS/HCC) - Primary I48.0     Other Visit Diagnoses         Codes    Urge incontinence     N39.41    Relevant Medications    oxybutynin XL (Ditropan-XL) 10 mg 24 hr tablet             OARRS:  Alvin Langston DO on 3/18/2024 12:15 PM  I have personally reviewed the OARRS report for Giulia Dai. I have considered the risks of abuse, dependence, addiction and diversion    Is the patient prescribed a combination of a benzodiazepine and opioid?  Yes, I feel it is clincially indicated to continue the medication and have discussed with the patient risks/benefits/alternatives.    Last Urine Drug Screen / ordered today: " No  Recent Results (from the past 8760 hour(s))   Benzodiazepine Confirmation, Urine    Collection Time: 06/19/23  2:36 PM   Result Value Ref Range    7-Aminoclonazepam <25 Cutoff <25 ng/mL    Alpha-Hydroxyalprazolam 568 (A) Cutoff <25 ng/mL    Alpha-Hydroxymidazolam <25 Cutoff <25 ng/mL    Alprazolam 117 (A) Cutoff <25 ng/mL    Chlordiazepoxide <25 Cutoff <25 ng/mL    Clonazepam <25 Cutoff <25 ng/mL    Diazepam <25 Cutoff <25 ng/mL    Lorazepam <25 Cutoff <25 ng/mL    Midazolam <25 Cutoff <25 ng/mL    Nordiazepam <25 Cutoff <25 ng/mL    Oxazepam <25 Cutoff <25 ng/mL    Temazepam <25 Cutoff <25 ng/mL   Drug Screen, Urine With Reflex to Confirmation    Collection Time: 06/19/23  2:36 PM   Result Value Ref Range    DRUG SCREEN COMMENT URINE SEE BELOW     Amphetamine Screen, Urine PRESUMPTIVE NEGATIVE NEGATIVE    Barbiturate Screen, Urine PRESUMPTIVE NEGATIVE NEGATIVE    BENZODIAZEPINE (PRESENCE) IN URINE BY SCREEN METHOD PRESUMPTIVE POSITIVE (A) NEGATIVE    Cannabinoid Screen, Urine PRESUMPTIVE NEGATIVE NEGATIVE    Cocaine Screen, Urine PRESUMPTIVE NEGATIVE NEGATIVE    Fentanyl, Ur PRESUMPTIVE NEGATIVE NEGATIVE    Methadone Screen, Urine PRESUMPTIVE NEGATIVE NEGATIVE    Opiate Screen, Urine PRESUMPTIVE NEGATIVE NEGATIVE    Oxycodone Screen, Ur PRESUMPTIVE NEGATIVE NEGATIVE    PCP Screen, Urine PRESUMPTIVE NEGATIVE NEGATIVE     Results are as expected.     Controlled Substance Agreement:  Date of the Last Agreement: 6/19/23  Reviewed Controlled Substance Agreement including but not limited to the benefits, risks, and alternatives to treatment with a Controlled Substance medication(s).    Benzodiazepines:  What is the patient's goal of therapy? anxiety  Is this being achieved with current treatment? yes    SAL-7:  No data recorded    Activities of Daily Living:   Is your overall impression that this patient is benefiting (symptom reduction outweighs side effects) from benzodiazepine therapy? Yes     1. Physical  Functioning: Better  2. Family Relationship: Better  3. Social Relationship: Better  4. Mood: Better  5. Sleep Patterns: Better  6. Overall Function: Better

## 2024-05-02 ENCOUNTER — HOSPITAL ENCOUNTER (OUTPATIENT)
Dept: RADIOLOGY | Facility: CLINIC | Age: 84
Discharge: HOME | End: 2024-05-02
Payer: MEDICARE

## 2024-05-02 DIAGNOSIS — R92.8 OTHER ABNORMAL AND INCONCLUSIVE FINDINGS ON DIAGNOSTIC IMAGING OF BREAST: ICD-10-CM

## 2024-05-02 DIAGNOSIS — R92.8 ABNORMALITY OF LEFT BREAST ON SCREENING MAMMOGRAM: ICD-10-CM

## 2024-05-02 PROCEDURE — 77065 DX MAMMO INCL CAD UNI: CPT | Mod: LEFT SIDE | Performed by: RADIOLOGY

## 2024-05-02 PROCEDURE — 77065 DX MAMMO INCL CAD UNI: CPT | Mod: LT

## 2024-05-07 ENCOUNTER — OFFICE VISIT (OUTPATIENT)
Dept: CARDIOLOGY | Facility: CLINIC | Age: 84
End: 2024-05-07
Payer: MEDICARE

## 2024-05-07 VITALS — SYSTOLIC BLOOD PRESSURE: 112 MMHG | WEIGHT: 144 LBS | DIASTOLIC BLOOD PRESSURE: 78 MMHG | BODY MASS INDEX: 23.24 KG/M2

## 2024-05-07 DIAGNOSIS — E78.00 PURE HYPERCHOLESTEROLEMIA: ICD-10-CM

## 2024-05-07 DIAGNOSIS — Z87.448 HISTORY OF RENAL INSUFFICIENCY SYNDROME: ICD-10-CM

## 2024-05-07 DIAGNOSIS — I48.0 PAROXYSMAL A-FIB (MULTI): ICD-10-CM

## 2024-05-07 DIAGNOSIS — I47.19 ATRIAL TACHYCARDIA (CMS-HCC): Primary | ICD-10-CM

## 2024-05-07 PROCEDURE — 1159F MED LIST DOCD IN RCRD: CPT | Performed by: INTERNAL MEDICINE

## 2024-05-07 PROCEDURE — 99214 OFFICE O/P EST MOD 30 MIN: CPT | Performed by: INTERNAL MEDICINE

## 2024-05-07 PROCEDURE — 3078F DIAST BP <80 MM HG: CPT | Performed by: INTERNAL MEDICINE

## 2024-05-07 PROCEDURE — 3074F SYST BP LT 130 MM HG: CPT | Performed by: INTERNAL MEDICINE

## 2024-05-07 PROCEDURE — 1160F RVW MEDS BY RX/DR IN RCRD: CPT | Performed by: INTERNAL MEDICINE

## 2024-05-07 NOTE — PROGRESS NOTES
Chief Complaint:   Follow-up     History Of Present Illness:    Giulia Dai is a 83 y.o. female presenting with CV dz.  Transitioned from Eliquis to Xarelto last OV-had visual changes with Eliquis.Visual changes better but had loose bowel movements thus only taking QOD  Feels well otherwise    Patient denies chest pain/SOB/dizziness/lightheadedness/edema/claudication    Active around the home  No bleeding with Xarelto             Last Recorded Vitals:  Vitals:    05/07/24 1520 05/07/24 1603   BP: 140/78 112/78   BP Location: Left arm    Patient Position: Sitting    Weight: 65.3 kg (144 lb)             Allergies:  Patient has no known allergies.    Outpatient Medications:  Current Outpatient Medications   Medication Instructions    allopurinol (ZYLOPRIM) 200 mg, oral, Daily    ALPRAZolam (XANAX) 0.5 mg, oral, 2 times daily, 2 times daily PRN    atenolol (TENORMIN) 50 mg, oral, Daily    levothyroxine (SYNTHROID, LEVOXYL) 25 mcg, oral, Daily    oxybutynin XL (DITROPAN-XL) 10 mg, oral, Daily, Do not crush, chew, or split.    rivaroxaban (XARELTO) 15 mg, oral, Daily with evening meal, Take with food.    valsartan-hydrochlorothiazide (Diovan-HCT) 80-12.5 mg tablet 1 tablet, oral, Daily RT    verapamil SR (CALAN-SR) 120 mg, oral, Daily       Physical Exam:  Constitutional:       General: Awake.      Appearance: Healthy appearance. Not in distress.   Neck:      Vascular: No JVR. JVD normal.   Pulmonary:      Effort: Pulmonary effort is normal.      Breath sounds: Normal breath sounds. No wheezing. No rhonchi. No rales.   Chest:      Chest wall: Not tender to palpatation.   Cardiovascular:      PMI at left midclavicular line. Bradycardia present. Occasional ectopic beats. Regular rhythm. Normal S1. Normal S2.       Murmurs: There is no murmur.      No gallop.  No click. No rub.   Pulses:     Intact distal pulses.   Edema:     Peripheral edema absent.   Abdominal:      General: Bowel sounds are normal.      Palpations:  "Abdomen is soft.      Tenderness: There is no abdominal tenderness.   Musculoskeletal: Normal range of motion.         General: No tenderness. Skin:     General: Skin is warm and dry.   Neurological:      General: No focal deficit present.      Mental Status: Alert and oriented to person, place and time.          Last Labs:  CBC -  Lab Results   Component Value Date    WBC 8.6 02/07/2022    HGB 11.6 (L) 02/07/2022    HCT 36.1 02/07/2022    MCV 96 02/07/2022     02/07/2022       CMP -  Lab Results   Component Value Date    CALCIUM 10.3 08/09/2023    PHOS 3.7 08/09/2023    PROT 6.9 11/23/2021    ALBUMIN 4.0 08/09/2023    AST 17 11/23/2021    ALT 13 11/23/2021    ALKPHOS 126 11/23/2021    BILITOT 0.8 11/23/2021       LIPID PANEL -   Lab Results   Component Value Date    CHOL 119 11/23/2021    TRIG 132 11/23/2021    HDL 31.4 (A) 11/23/2021    CHHDL 3.8 11/23/2021    LDLF 61 11/23/2021    VLDL 26 11/23/2021    NHDL 125 11/07/2018       RENAL FUNCTION PANEL -   Lab Results   Component Value Date    GLUCOSE 84 08/09/2023     (L) 08/09/2023    K 4.1 08/09/2023     08/09/2023    CO2 27 08/09/2023    ANIONGAP 11 08/09/2023    BUN 17 08/09/2023    CREATININE 1.04 08/09/2023    CALCIUM 10.3 08/09/2023    PHOS 3.7 08/09/2023    ALBUMIN 4.0 08/09/2023        No results found for: \"BNP\", \"HGBA1C\"        Lab review: I have personally reviewed the laboratory result(s)       Problem List Items Addressed This Visit       Hyperlipidemia    Overview     Statin Dc'd by Renal. No definite indication for one at this time.         History of renal insufficiency syndrome    Overview     11/23/2021 Cr=3.68   12/2020 Cr=1.65   Stopped valsartan-HCTZ   2/2022 Cr=1   8/2022 Cr=1.07   8/2023 Cr=1.04         Atrial tachycardia (CMS-HCC) - Primary    Overview     2:1 atrial tach noted on EKG on 12/5/23 OV  Checked EM-had PAFIB-started  Eliquis  Believed Eliquis was causing blurred vision-changed to Xarelto(low dose as Cr " clearance=41)  In NSR today           Paroxysmal A-fib (Multi)    Overview     Noted on 12/2023 holter    In NSR today    Has [id number] score of at least 4 thus high risk for CE stroke-on AC    Note changed Eliquis to Xarelto(low dose as Cr clearance =41) as c/o blurred vision from Eliquis  Now with some diarrhea with Xarelto-follow              Resume daily Xarelto        Colt Morrow, DO

## 2024-06-18 ENCOUNTER — APPOINTMENT (OUTPATIENT)
Dept: CARDIOLOGY | Facility: CLINIC | Age: 84
End: 2024-06-18
Payer: MEDICARE

## 2024-06-24 ENCOUNTER — LAB (OUTPATIENT)
Dept: LAB | Facility: LAB | Age: 84
End: 2024-06-24
Payer: MEDICARE

## 2024-06-24 ENCOUNTER — APPOINTMENT (OUTPATIENT)
Dept: PRIMARY CARE | Facility: CLINIC | Age: 84
End: 2024-06-24
Payer: MEDICARE

## 2024-06-24 VITALS
DIASTOLIC BLOOD PRESSURE: 70 MMHG | WEIGHT: 143.3 LBS | HEIGHT: 66 IN | TEMPERATURE: 97.7 F | SYSTOLIC BLOOD PRESSURE: 132 MMHG | BODY MASS INDEX: 23.03 KG/M2

## 2024-06-24 DIAGNOSIS — Z51.81 MEDICATION MONITORING ENCOUNTER: ICD-10-CM

## 2024-06-24 DIAGNOSIS — S82.852S CLOSED TRIMALLEOLAR FRACTURE OF LEFT ANKLE, SEQUELA: Primary | ICD-10-CM

## 2024-06-24 DIAGNOSIS — F41.9 ANXIETY: ICD-10-CM

## 2024-06-24 DIAGNOSIS — E78.00 PURE HYPERCHOLESTEROLEMIA: ICD-10-CM

## 2024-06-24 DIAGNOSIS — I10 HYPERTENSION, UNSPECIFIED TYPE: ICD-10-CM

## 2024-06-24 LAB
AMPHETAMINES UR QL SCN: ABNORMAL
BARBITURATES UR QL SCN: ABNORMAL
BENZODIAZ UR QL SCN: ABNORMAL
BZE UR QL SCN: ABNORMAL
CANNABINOIDS UR QL SCN: ABNORMAL
FENTANYL+NORFENTANYL UR QL SCN: ABNORMAL
METHADONE UR QL SCN: ABNORMAL
OPIATES UR QL SCN: ABNORMAL
OXYCODONE+OXYMORPHONE UR QL SCN: ABNORMAL
PCP UR QL SCN: ABNORMAL

## 2024-06-24 PROCEDURE — 1036F TOBACCO NON-USER: CPT | Performed by: FAMILY MEDICINE

## 2024-06-24 PROCEDURE — 1160F RVW MEDS BY RX/DR IN RCRD: CPT | Performed by: FAMILY MEDICINE

## 2024-06-24 PROCEDURE — 99213 OFFICE O/P EST LOW 20 MIN: CPT | Performed by: FAMILY MEDICINE

## 2024-06-24 PROCEDURE — 1126F AMNT PAIN NOTED NONE PRSNT: CPT | Performed by: FAMILY MEDICINE

## 2024-06-24 PROCEDURE — 1159F MED LIST DOCD IN RCRD: CPT | Performed by: FAMILY MEDICINE

## 2024-06-24 PROCEDURE — 80346 BENZODIAZEPINES1-12: CPT

## 2024-06-24 PROCEDURE — 80307 DRUG TEST PRSMV CHEM ANLYZR: CPT

## 2024-06-24 PROCEDURE — 3078F DIAST BP <80 MM HG: CPT | Performed by: FAMILY MEDICINE

## 2024-06-24 PROCEDURE — 3075F SYST BP GE 130 - 139MM HG: CPT | Performed by: FAMILY MEDICINE

## 2024-06-24 RX ORDER — ALPRAZOLAM 0.5 MG/1
0.5 TABLET ORAL 2 TIMES DAILY
Qty: 60 TABLET | Refills: 2 | Status: SHIPPED | OUTPATIENT
Start: 2024-06-24

## 2024-06-24 ASSESSMENT — ENCOUNTER SYMPTOMS
CONSTITUTIONAL NEGATIVE: 1
OCCASIONAL FEELINGS OF UNSTEADINESS: 0
DEPRESSION: 0
NERVOUS/ANXIOUS: 1
LOSS OF SENSATION IN FEET: 0
ARTHRALGIAS: 1

## 2024-06-24 ASSESSMENT — PAIN SCALES - GENERAL: PAINLEVEL: 0-NO PAIN

## 2024-06-24 NOTE — PROGRESS NOTES
"Subjective   Patient ID: Giulia Dai is a 84 y.o. female who presents for Follow-up (Med refills).   3 month med refills  HPI     Review of Systems   Constitutional: Negative.    Musculoskeletal:  Positive for arthralgias.   Psychiatric/Behavioral:  The patient is nervous/anxious.        Objective   /70 (BP Location: Left arm)   Temp 36.5 °C (97.7 °F) (Temporal)   Ht 1.676 m (5' 6\")   Wt 65 kg (143 lb 4.8 oz)   BMI 23.13 kg/m²     Physical Exam  Vitals and nursing note reviewed.   Constitutional:       Appearance: Normal appearance.   Neurological:      General: No focal deficit present.      Mental Status: She is alert and oriented to person, place, and time.   Psychiatric:         Mood and Affect: Mood normal.         Behavior: Behavior normal.         Assessment/Plan patient seen here to follow-up on her medication for anxiety we refilled the meds I will see her back in 3 months  Problem List Items Addressed This Visit             ICD-10-CM    Anxiety F41.9    Relevant Medications    ALPRAZolam (Xanax) 0.5 mg tablet    Other Relevant Orders    Drug Screen, Urine With Reflex to Confirmation    Closed trimalleolar fracture of left ankle - Primary S82.852A    Relevant Orders    Disability Placard    Hyperlipidemia E78.5    Hypertension I10     Other Visit Diagnoses         Codes    Medication monitoring encounter     Z51.81    Relevant Orders    Drug Screen, Urine With Reflex to Confirmation             OARRS:  Alvin Langston DO on 6/24/2024 12:37 PM  I have personally reviewed the OARRS report for Giulia Dai. I have considered the risks of abuse, dependence, addiction and diversion    Is the patient prescribed a combination of a benzodiazepine and opioid?  Yes, I feel it is clincially indicated to continue the medication and have discussed with the patient risks/benefits/alternatives.    Last Urine Drug Screen / ordered today: Yes  No results found for this or any previous visit (from the " past 8760 hour(s)).  N/A    Controlled Substance Agreement:  Date of the Last Agreement: 6/24/24  Reviewed Controlled Substance Agreement including but not limited to the benefits, risks, and alternatives to treatment with a Controlled Substance medication(s).    Benzodiazepines:  What is the patient's goal of therapy? anxiety  Is this being achieved with current treatment? yes    SAL-7:  No data recorded    Activities of Daily Living:   Is your overall impression that this patient is benefiting (symptom reduction outweighs side effects) from benzodiazepine therapy? Yes     1. Physical Functioning: Better  2. Family Relationship: Better  3. Social Relationship: Better  4. Mood: Better  5. Sleep Patterns: Better  6. Overall Function: Better

## 2024-06-27 LAB
1OH-MIDAZOLAM UR CFM-MCNC: <25 NG/ML
7AMINOCLONAZEPAM UR CFM-MCNC: <25 NG/ML
A-OH ALPRAZ UR CFM-MCNC: 185 NG/ML
ALPRAZ UR CFM-MCNC: 57 NG/ML
CHLORDIAZEP UR CFM-MCNC: <25 NG/ML
CLONAZEPAM UR CFM-MCNC: <25 NG/ML
DIAZEPAM UR CFM-MCNC: <25 NG/ML
LORAZEPAM UR CFM-MCNC: <25 NG/ML
MIDAZOLAM UR CFM-MCNC: <25 NG/ML
NORDIAZEPAM UR CFM-MCNC: <25 NG/ML
OXAZEPAM UR CFM-MCNC: <25 NG/ML
TEMAZEPAM UR CFM-MCNC: <25 NG/ML

## 2024-08-05 ENCOUNTER — LAB (OUTPATIENT)
Dept: LAB | Facility: LAB | Age: 84
End: 2024-08-05
Payer: MEDICARE

## 2024-08-05 DIAGNOSIS — N18.32 CHRONIC KIDNEY DISEASE, STAGE 3B (MULTI): Primary | ICD-10-CM

## 2024-08-05 DIAGNOSIS — N25.81 SECONDARY HYPERPARATHYROIDISM OF RENAL ORIGIN (MULTI): ICD-10-CM

## 2024-08-05 PROCEDURE — 82043 UR ALBUMIN QUANTITATIVE: CPT

## 2024-08-05 PROCEDURE — 82570 ASSAY OF URINE CREATININE: CPT

## 2024-08-05 PROCEDURE — 83970 ASSAY OF PARATHORMONE: CPT

## 2024-08-05 PROCEDURE — 82306 VITAMIN D 25 HYDROXY: CPT

## 2024-08-05 PROCEDURE — 36415 COLL VENOUS BLD VENIPUNCTURE: CPT

## 2024-08-05 PROCEDURE — 80069 RENAL FUNCTION PANEL: CPT

## 2024-08-06 LAB
25(OH)D3 SERPL-MCNC: 19 NG/ML (ref 30–100)
ALBUMIN SERPL BCP-MCNC: 4.1 G/DL (ref 3.4–5)
ANION GAP SERPL CALC-SCNC: 15 MMOL/L (ref 10–20)
BUN SERPL-MCNC: 11 MG/DL (ref 6–23)
CALCIUM SERPL-MCNC: 9.8 MG/DL (ref 8.6–10.6)
CHLORIDE SERPL-SCNC: 102 MMOL/L (ref 98–107)
CO2 SERPL-SCNC: 25 MMOL/L (ref 21–32)
CREAT SERPL-MCNC: 1.03 MG/DL (ref 0.5–1.05)
CREAT UR-MCNC: 43.2 MG/DL (ref 20–320)
EGFRCR SERPLBLD CKD-EPI 2021: 54 ML/MIN/1.73M*2
GLUCOSE SERPL-MCNC: 88 MG/DL (ref 74–99)
MICROALBUMIN UR-MCNC: 8.5 MG/L
MICROALBUMIN/CREAT UR: 19.7 UG/MG CREAT
PHOSPHATE SERPL-MCNC: 3.2 MG/DL (ref 2.5–4.9)
POTASSIUM SERPL-SCNC: 4.3 MMOL/L (ref 3.5–5.3)
PTH-INTACT SERPL-MCNC: 106.4 PG/ML (ref 18.5–88)
SODIUM SERPL-SCNC: 138 MMOL/L (ref 136–145)

## 2024-08-09 DIAGNOSIS — I10 ESSENTIAL (PRIMARY) HYPERTENSION: ICD-10-CM

## 2024-08-09 RX ORDER — VERAPAMIL HYDROCHLORIDE 120 MG/1
120 TABLET, FILM COATED, EXTENDED RELEASE ORAL DAILY
Qty: 90 TABLET | Refills: 2 | Status: SHIPPED | OUTPATIENT
Start: 2024-08-09

## 2024-09-23 DIAGNOSIS — E03.9 HYPOTHYROIDISM, UNSPECIFIED: ICD-10-CM

## 2024-09-24 RX ORDER — LEVOTHYROXINE SODIUM 25 UG/1
25 TABLET ORAL DAILY
Qty: 90 TABLET | Refills: 2 | Status: SHIPPED | OUTPATIENT
Start: 2024-09-24

## 2024-09-27 ENCOUNTER — APPOINTMENT (OUTPATIENT)
Dept: PRIMARY CARE | Facility: CLINIC | Age: 84
End: 2024-09-27
Payer: MEDICARE

## 2024-09-27 VITALS
TEMPERATURE: 97.6 F | SYSTOLIC BLOOD PRESSURE: 132 MMHG | DIASTOLIC BLOOD PRESSURE: 70 MMHG | WEIGHT: 142.2 LBS | BODY MASS INDEX: 22.85 KG/M2 | HEIGHT: 66 IN

## 2024-09-27 DIAGNOSIS — F41.9 ANXIETY: ICD-10-CM

## 2024-09-27 PROCEDURE — 99213 OFFICE O/P EST LOW 20 MIN: CPT | Performed by: FAMILY MEDICINE

## 2024-09-27 PROCEDURE — 3075F SYST BP GE 130 - 139MM HG: CPT | Performed by: FAMILY MEDICINE

## 2024-09-27 PROCEDURE — 1126F AMNT PAIN NOTED NONE PRSNT: CPT | Performed by: FAMILY MEDICINE

## 2024-09-27 PROCEDURE — 1036F TOBACCO NON-USER: CPT | Performed by: FAMILY MEDICINE

## 2024-09-27 PROCEDURE — 1159F MED LIST DOCD IN RCRD: CPT | Performed by: FAMILY MEDICINE

## 2024-09-27 PROCEDURE — 3078F DIAST BP <80 MM HG: CPT | Performed by: FAMILY MEDICINE

## 2024-09-27 PROCEDURE — 1160F RVW MEDS BY RX/DR IN RCRD: CPT | Performed by: FAMILY MEDICINE

## 2024-09-27 RX ORDER — ALPRAZOLAM 0.5 MG/1
0.5 TABLET ORAL 2 TIMES DAILY
Qty: 60 TABLET | Refills: 2 | Status: SHIPPED | OUTPATIENT
Start: 2024-09-27

## 2024-09-27 ASSESSMENT — ENCOUNTER SYMPTOMS
CONSTITUTIONAL NEGATIVE: 1
SLEEP DISTURBANCE: 1
NERVOUS/ANXIOUS: 1
DEPRESSION: 0
LOSS OF SENSATION IN FEET: 0
OCCASIONAL FEELINGS OF UNSTEADINESS: 0

## 2024-09-27 ASSESSMENT — PAIN SCALES - GENERAL: PAINLEVEL: 0-NO PAIN

## 2024-09-27 NOTE — PROGRESS NOTES
"Subjective   Patient ID: Giulia Dai is a 84 y.o. female who presents for Follow-up (Med refills).   3 month med refills  HPI     Review of Systems   Constitutional: Negative.    Psychiatric/Behavioral:  Positive for sleep disturbance. The patient is nervous/anxious.        Objective   /70 (BP Location: Left arm)   Temp 36.4 °C (97.6 °F) (Temporal)   Ht 1.676 m (5' 6\")   Wt 64.5 kg (142 lb 3.2 oz)   BMI 22.95 kg/m²     Physical Exam  Vitals and nursing note reviewed.   Constitutional:       Appearance: Normal appearance.   Neurological:      General: No focal deficit present.      Mental Status: She is alert and oriented to person, place, and time.   Psychiatric:         Mood and Affect: Mood normal.         Behavior: Behavior normal.         Assessment/Plan patient seen for follow-up on her alprazolam.  We refilled her meds we will see her back in 3 months  Problem List Items Addressed This Visit             ICD-10-CM    Anxiety F41.9    Relevant Medications    ALPRAZolam (Xanax) 0.5 mg tablet        OARRS:  Alvin Langston DO on 9/27/2024  2:39 PM  I have personally reviewed the OARRS report for Giulia Dai. I have considered the risks of abuse, dependence, addiction and diversion    Is the patient prescribed a combination of a benzodiazepine and opioid?  Yes, I feel it is clincially indicated to continue the medication and have discussed with the patient risks/benefits/alternatives.    Last Urine Drug Screen / ordered today: No  Recent Results (from the past 8760 hour(s))   Benzodiazepine Confirmation, Urine    Collection Time: 06/24/24  2:26 PM   Result Value Ref Range    Clonazepam <25 <25 ng/mL    7-Aminoclonazepam <25 <25 ng/mL    Alprazolam 57 (H) <25 ng/mL    Alpha-Hydroxyalprazolam 185 (H) <25 ng/mL    Midazolam <25 <25 ng/mL    Alpha-Hydroxymidazolam <25 <25 ng/mL    Chlordiazepoxide <25 <25 ng/mL    Diazepam <25 <25 ng/mL    Nordiazepam <25 <25 ng/mL    Temazepam <25 <25 ng/mL    " Oxazepam <25 <25 ng/mL    Lorazepam <25 <25 ng/mL   Drug Screen, Urine With Reflex to Confirmation    Collection Time: 06/24/24  2:26 PM   Result Value Ref Range    Amphetamine Screen, Urine Presumptive Negative Presumptive Negative    Barbiturate Screen, Urine Presumptive Negative Presumptive Negative    Benzodiazepines Screen, Urine Presumptive Positive (A) Presumptive Negative    Cannabinoid Screen, Urine Presumptive Negative Presumptive Negative    Cocaine Metabolite Screen, Urine Presumptive Negative Presumptive Negative    Fentanyl Screen, Urine Presumptive Negative Presumptive Negative    Opiate Screen, Urine Presumptive Negative Presumptive Negative    Oxycodone Screen, Urine Presumptive Negative Presumptive Negative    PCP Screen, Urine Presumptive Negative Presumptive Negative    Methadone Screen, Urine Presumptive Negative Presumptive Negative     Results are as expected.     Controlled Substance Agreement:  Date of the Last Agreement: 6/24/24  Reviewed Controlled Substance Agreement including but not limited to the benefits, risks, and alternatives to treatment with a Controlled Substance medication(s).    Benzodiazepines:  What is the patient's goal of therapy? anxiety  Is this being achieved with current treatment? yes    SAL-7:  No data recorded    Activities of Daily Living:   Is your overall impression that this patient is benefiting (symptom reduction outweighs side effects) from benzodiazepine therapy? Yes     1. Physical Functioning: Better  2. Family Relationship: Better  3. Social Relationship: Better  4. Mood: Better  5. Sleep Patterns: Better  6. Overall Function: Better

## 2024-10-17 PROBLEM — I25.2 HISTORY OF MYOCARDIAL INFARCTION: Status: ACTIVE | Noted: 2024-10-17

## 2024-10-27 DIAGNOSIS — I10 ESSENTIAL (PRIMARY) HYPERTENSION: ICD-10-CM

## 2024-10-28 RX ORDER — ATENOLOL 50 MG/1
50 TABLET ORAL DAILY
Qty: 90 TABLET | Refills: 3 | Status: SHIPPED | OUTPATIENT
Start: 2024-10-28

## 2024-11-07 ENCOUNTER — APPOINTMENT (OUTPATIENT)
Dept: CARDIOLOGY | Facility: CLINIC | Age: 84
End: 2024-11-07
Payer: MEDICARE

## 2024-11-07 VITALS
BODY MASS INDEX: 22.98 KG/M2 | HEIGHT: 66 IN | DIASTOLIC BLOOD PRESSURE: 90 MMHG | WEIGHT: 143 LBS | SYSTOLIC BLOOD PRESSURE: 148 MMHG | HEART RATE: 70 BPM

## 2024-11-07 DIAGNOSIS — I48.0 PAROXYSMAL A-FIB (MULTI): ICD-10-CM

## 2024-11-07 DIAGNOSIS — Z79.82 ASPIRIN LONG-TERM USE: ICD-10-CM

## 2024-11-07 DIAGNOSIS — I65.29 STENOSIS OF CAROTID ARTERY, UNSPECIFIED LATERALITY: Primary | ICD-10-CM

## 2024-11-07 DIAGNOSIS — Z87.448 HISTORY OF RENAL INSUFFICIENCY SYNDROME: ICD-10-CM

## 2024-11-07 DIAGNOSIS — I10 HYPERTENSION, UNSPECIFIED TYPE: ICD-10-CM

## 2024-11-07 DIAGNOSIS — E78.00 PURE HYPERCHOLESTEROLEMIA: ICD-10-CM

## 2024-11-07 DIAGNOSIS — I47.19 ATRIAL TACHYCARDIA (CMS-HCC): ICD-10-CM

## 2024-11-07 PROCEDURE — G2211 COMPLEX E/M VISIT ADD ON: HCPCS | Performed by: INTERNAL MEDICINE

## 2024-11-07 PROCEDURE — 3079F DIAST BP 80-89 MM HG: CPT | Performed by: INTERNAL MEDICINE

## 2024-11-07 PROCEDURE — 3077F SYST BP >= 140 MM HG: CPT | Performed by: INTERNAL MEDICINE

## 2024-11-07 PROCEDURE — 99214 OFFICE O/P EST MOD 30 MIN: CPT | Performed by: INTERNAL MEDICINE

## 2024-11-07 PROCEDURE — 1159F MED LIST DOCD IN RCRD: CPT | Performed by: INTERNAL MEDICINE

## 2024-11-07 RX ORDER — ASPIRIN 81 MG/1
81 TABLET ORAL
COMMUNITY
End: 2024-11-07 | Stop reason: ALTCHOICE

## 2024-11-07 NOTE — PROGRESS NOTES
"Chief Complaint:   Follow-up (Giulia is here for a 6 month follow up)     History Of Present Illness:    Giulia Dai is a 84 y.o. female presenting with CV dz.    Patient denies chest pain/SOB/dizziness/lightheadedness/edema/claudication    Active around the home  No bleeding with Xarelto             Last Recorded Vitals:  Vitals:    11/07/24 1354 11/07/24 1408   BP: 146/84 148/90   BP Location: Right arm    Patient Position: Sitting    BP Cuff Size: Adult    Pulse: 70    Weight: 64.9 kg (143 lb)    Height: 1.676 m (5' 6\")             Allergies:  Patient has no known allergies.    Outpatient Medications:  Current Outpatient Medications   Medication Instructions    allopurinol (ZYLOPRIM) 200 mg, oral, Daily    ALPRAZolam (XANAX) 0.5 mg, oral, 2 times daily, 2 times daily PRN    atenolol (TENORMIN) 50 mg, oral, Daily    levothyroxine (SYNTHROID, LEVOXYL) 25 mcg, oral, Daily    rivaroxaban (XARELTO) 15 mg, oral, Daily with evening meal, Take with food.    valsartan-hydrochlorothiazide (Diovan-HCT) 80-12.5 mg tablet 1 tablet, Daily RT    verapamil SR (CALAN-SR) 120 mg, oral, Daily       Physical Exam:  Constitutional:       General: Awake.      Appearance: Healthy appearance. Not in distress.   Neck:      Vascular: No JVR. JVD normal.   Pulmonary:      Effort: Pulmonary effort is normal.      Breath sounds: Normal breath sounds. No wheezing. No rhonchi. No rales.   Chest:      Chest wall: Not tender to palpatation.   Cardiovascular:      PMI at left midclavicular line. Normal rate. Occasional ectopic beats. Regular rhythm. Normal S1. Normal S2.       Murmurs: There is no murmur.      No gallop.  No click. No rub.   Pulses:     Intact distal pulses.   Edema:     Peripheral edema absent.   Abdominal:      General: Bowel sounds are normal.      Palpations: Abdomen is soft.      Tenderness: There is no abdominal tenderness.   Musculoskeletal: Normal range of motion.         General: No tenderness. Skin:     General: " "Skin is warm and dry.   Neurological:      General: No focal deficit present.      Mental Status: Alert and oriented to person, place and time.          Last Labs:  CBC -  Lab Results   Component Value Date    WBC 8.6 02/07/2022    HGB 11.6 (L) 02/07/2022    HCT 36.1 02/07/2022    MCV 96 02/07/2022     02/07/2022       CMP -  Lab Results   Component Value Date    CALCIUM 9.8 08/05/2024    PHOS 3.2 08/05/2024    PROT 6.9 11/23/2021    ALBUMIN 4.1 08/05/2024    AST 17 11/23/2021    ALT 13 11/23/2021    ALKPHOS 126 11/23/2021    BILITOT 0.8 11/23/2021       LIPID PANEL -   Lab Results   Component Value Date    CHOL 119 11/23/2021    TRIG 132 11/23/2021    HDL 31.4 (A) 11/23/2021    CHHDL 3.8 11/23/2021    LDLF 61 11/23/2021    VLDL 26 11/23/2021    NHDL 125 11/07/2018       RENAL FUNCTION PANEL -   Lab Results   Component Value Date    GLUCOSE 88 08/05/2024     08/05/2024    K 4.3 08/05/2024     08/05/2024    CO2 25 08/05/2024    ANIONGAP 15 08/05/2024    BUN 11 08/05/2024    CREATININE 1.03 08/05/2024    CALCIUM 9.8 08/05/2024    PHOS 3.2 08/05/2024    ALBUMIN 4.1 08/05/2024        No results found for: \"BNP\", \"HGBA1C\"        Lab review: I have personally reviewed the laboratory result(s)       Problem List Items Addressed This Visit       Carotid stenosis - Primary    Overview     Pt with mild dz per 2012 duplex. 2/2019 duplex with less than 50% stenosis. No bruit. Follow.         Hyperlipidemia    Overview     Statin Dc'd by Renal. No definite indication for one at this time.         Hypertension    Overview     BP elevated in office well controlled on home check-no current med changes         History of renal insufficiency syndrome    Overview     11/23/2021 Cr=3.68   12/2020 Cr=1.65   Stopped valsartan-HCTZ   2/2022 Cr=1   8/2022 Cr=1.07   8/2023 Cr=1.04  8/2024 Cr=1.03         Atrial tachycardia (CMS-HCC)    Overview     2:1 atrial tach noted on EKG on 12/5/23 OV  Checked EM-had PAFIB-started  " Eliquis  Believed Eliquis was causing blurred vision-changed to Xarelto(low dose as Cr clearance)was 41-more recent was 54)  In NSR today           Paroxysmal A-fib (Multi)    Overview     Noted on 12/2023 holter    In NSR today    Has [id number] score of at least 4 thus high risk for CE stroke-on AC    Note changed Eliquis to Xarelto(low dose as Cr clearance =41-54) as c/o blurred vision from Eliquis             Aspirin long-term use    Overview     DC ASA as on Xarelto            Stop daily aspirin as you are on Xarelto        Colt Morrow, DO

## 2024-12-23 ENCOUNTER — APPOINTMENT (OUTPATIENT)
Dept: PRIMARY CARE | Facility: CLINIC | Age: 84
End: 2024-12-23
Payer: MEDICARE

## 2025-01-03 ENCOUNTER — LAB (OUTPATIENT)
Dept: LAB | Facility: LAB | Age: 85
End: 2025-01-03
Payer: MEDICARE

## 2025-01-03 ENCOUNTER — APPOINTMENT (OUTPATIENT)
Dept: PRIMARY CARE | Facility: CLINIC | Age: 85
End: 2025-01-03
Payer: MEDICARE

## 2025-01-03 VITALS
WEIGHT: 141.4 LBS | TEMPERATURE: 97.2 F | DIASTOLIC BLOOD PRESSURE: 76 MMHG | HEIGHT: 66 IN | BODY MASS INDEX: 22.73 KG/M2 | SYSTOLIC BLOOD PRESSURE: 140 MMHG

## 2025-01-03 DIAGNOSIS — I10 HYPERTENSION, UNSPECIFIED TYPE: ICD-10-CM

## 2025-01-03 DIAGNOSIS — I48.0 PAROXYSMAL A-FIB (MULTI): ICD-10-CM

## 2025-01-03 DIAGNOSIS — N18.32 STAGE 3B CHRONIC KIDNEY DISEASE (MULTI): ICD-10-CM

## 2025-01-03 DIAGNOSIS — E03.9 ADULT HYPOTHYROIDISM: ICD-10-CM

## 2025-01-03 DIAGNOSIS — Z00.00 MEDICARE ANNUAL WELLNESS VISIT, SUBSEQUENT: Primary | ICD-10-CM

## 2025-01-03 DIAGNOSIS — E78.00 PURE HYPERCHOLESTEROLEMIA: ICD-10-CM

## 2025-01-03 DIAGNOSIS — Z12.31 SCREENING MAMMOGRAM FOR BREAST CANCER: ICD-10-CM

## 2025-01-03 LAB
ALBUMIN SERPL BCP-MCNC: 4.1 G/DL (ref 3.4–5)
ALP SERPL-CCNC: 100 U/L (ref 33–136)
ALT SERPL W P-5'-P-CCNC: 16 U/L (ref 7–45)
ANION GAP SERPL CALC-SCNC: 11 MMOL/L (ref 10–20)
AST SERPL W P-5'-P-CCNC: 21 U/L (ref 9–39)
BASOPHILS # BLD AUTO: 0.1 X10*3/UL (ref 0–0.1)
BASOPHILS NFR BLD AUTO: 1 %
BILIRUB SERPL-MCNC: 0.9 MG/DL (ref 0–1.2)
BUN SERPL-MCNC: 14 MG/DL (ref 6–23)
CALCIUM SERPL-MCNC: 10 MG/DL (ref 8.6–10.6)
CHLORIDE SERPL-SCNC: 103 MMOL/L (ref 98–107)
CHOLEST SERPL-MCNC: 221 MG/DL (ref 0–199)
CHOLESTEROL/HDL RATIO: 4.6
CO2 SERPL-SCNC: 28 MMOL/L (ref 21–32)
CREAT SERPL-MCNC: 1.11 MG/DL (ref 0.5–1.05)
CREAT UR-MCNC: 60.7 MG/DL (ref 20–320)
EGFRCR SERPLBLD CKD-EPI 2021: 49 ML/MIN/1.73M*2
EOSINOPHIL # BLD AUTO: 0.27 X10*3/UL (ref 0–0.4)
EOSINOPHIL NFR BLD AUTO: 2.8 %
ERYTHROCYTE [DISTWIDTH] IN BLOOD BY AUTOMATED COUNT: 14.6 % (ref 11.5–14.5)
GLUCOSE SERPL-MCNC: 98 MG/DL (ref 74–99)
HCT VFR BLD AUTO: 37.3 % (ref 36–46)
HDLC SERPL-MCNC: 47.7 MG/DL
HGB BLD-MCNC: 12.1 G/DL (ref 12–16)
IMM GRANULOCYTES # BLD AUTO: 0.03 X10*3/UL (ref 0–0.5)
IMM GRANULOCYTES NFR BLD AUTO: 0.3 % (ref 0–0.9)
LDLC SERPL CALC-MCNC: 152 MG/DL
LYMPHOCYTES # BLD AUTO: 1.96 X10*3/UL (ref 0.8–3)
LYMPHOCYTES NFR BLD AUTO: 20.1 %
MCH RBC QN AUTO: 30.1 PG (ref 26–34)
MCHC RBC AUTO-ENTMCNC: 32.4 G/DL (ref 32–36)
MCV RBC AUTO: 93 FL (ref 80–100)
MICROALBUMIN UR-MCNC: <7 MG/L
MICROALBUMIN/CREAT UR: NORMAL MG/G{CREAT}
MONOCYTES # BLD AUTO: 0.7 X10*3/UL (ref 0.05–0.8)
MONOCYTES NFR BLD AUTO: 7.2 %
NEUTROPHILS # BLD AUTO: 6.67 X10*3/UL (ref 1.6–5.5)
NEUTROPHILS NFR BLD AUTO: 68.6 %
NON HDL CHOLESTEROL: 173 MG/DL (ref 0–149)
NRBC BLD-RTO: 0 /100 WBCS (ref 0–0)
PLATELET # BLD AUTO: 302 X10*3/UL (ref 150–450)
POTASSIUM SERPL-SCNC: 4 MMOL/L (ref 3.5–5.3)
PROT SERPL-MCNC: 7.3 G/DL (ref 6.4–8.2)
RBC # BLD AUTO: 4.02 X10*6/UL (ref 4–5.2)
SODIUM SERPL-SCNC: 138 MMOL/L (ref 136–145)
TRIGL SERPL-MCNC: 107 MG/DL (ref 0–149)
TSH SERPL-ACNC: 3.65 MIU/L (ref 0.44–3.98)
VLDL: 21 MG/DL (ref 0–40)
WBC # BLD AUTO: 9.7 X10*3/UL (ref 4.4–11.3)

## 2025-01-03 PROCEDURE — G0008 ADMIN INFLUENZA VIRUS VAC: HCPCS | Performed by: FAMILY MEDICINE

## 2025-01-03 PROCEDURE — 3078F DIAST BP <80 MM HG: CPT | Performed by: FAMILY MEDICINE

## 2025-01-03 PROCEDURE — 99497 ADVNCD CARE PLAN 30 MIN: CPT | Performed by: FAMILY MEDICINE

## 2025-01-03 PROCEDURE — 1036F TOBACCO NON-USER: CPT | Performed by: FAMILY MEDICINE

## 2025-01-03 PROCEDURE — 84443 ASSAY THYROID STIM HORMONE: CPT

## 2025-01-03 PROCEDURE — 82570 ASSAY OF URINE CREATININE: CPT

## 2025-01-03 PROCEDURE — 85025 COMPLETE CBC W/AUTO DIFF WBC: CPT

## 2025-01-03 PROCEDURE — 99397 PER PM REEVAL EST PAT 65+ YR: CPT | Performed by: FAMILY MEDICINE

## 2025-01-03 PROCEDURE — 1159F MED LIST DOCD IN RCRD: CPT | Performed by: FAMILY MEDICINE

## 2025-01-03 PROCEDURE — 80053 COMPREHEN METABOLIC PANEL: CPT

## 2025-01-03 PROCEDURE — 3077F SYST BP >= 140 MM HG: CPT | Performed by: FAMILY MEDICINE

## 2025-01-03 PROCEDURE — 1170F FXNL STATUS ASSESSED: CPT | Performed by: FAMILY MEDICINE

## 2025-01-03 PROCEDURE — 80061 LIPID PANEL: CPT

## 2025-01-03 PROCEDURE — 82043 UR ALBUMIN QUANTITATIVE: CPT

## 2025-01-03 PROCEDURE — 1160F RVW MEDS BY RX/DR IN RCRD: CPT | Performed by: FAMILY MEDICINE

## 2025-01-03 PROCEDURE — 90662 IIV NO PRSV INCREASED AG IM: CPT | Performed by: FAMILY MEDICINE

## 2025-01-03 PROCEDURE — 1126F AMNT PAIN NOTED NONE PRSNT: CPT | Performed by: FAMILY MEDICINE

## 2025-01-03 ASSESSMENT — ENCOUNTER SYMPTOMS
DEPRESSION: 0
CONSTITUTIONAL NEGATIVE: 1
ARTHRALGIAS: 1
RESPIRATORY NEGATIVE: 1
CARDIOVASCULAR NEGATIVE: 1
NEUROLOGICAL NEGATIVE: 1
OCCASIONAL FEELINGS OF UNSTEADINESS: 0
GASTROINTESTINAL NEGATIVE: 1
PSYCHIATRIC NEGATIVE: 1
ENDOCRINE NEGATIVE: 1
ENDOCRINE COMMENTS: THYROID DISEASE
LOSS OF SENSATION IN FEET: 0

## 2025-01-03 ASSESSMENT — GERIATRIC MINI NUTRITIONAL ASSESSMENT (MNA)
A HAS FOOD INTAKE DECLINED OVER THE PAST 3 MONTHS DUE TO LOSS OF APPETITE, DIGESTIVE PROBLEMS, CHEWING OR SWALLOWING DIFFICULTIES?: NO DECREASE IN FOOD INTAKE
B WEIGHT LOSS DURING THE LAST 3 MONTHS: NO WEIGHT LOSS
E NEUROPSYCHOLOGICAL PROBLEMS: NO PSYCHOLOGICAL PROBLEMS
C GENERAL MOBILITY: GOES OUT
D HAS SUFFERED PSYCHOLOGICAL STRESS OR ACUTE DISEASE IN THE PAST 3 MONTHS?: NO

## 2025-01-03 ASSESSMENT — ACTIVITIES OF DAILY LIVING (ADL)
ADEQUATE_TO_COMPLETE_ADL: YES
TOILETING: INDEPENDENT
WALKS IN HOME: INDEPENDENT
PREPARING MEALS: INDEPENDENT
GROOMING: INDEPENDENT
JUDGMENT_ADEQUATE_SAFELY_COMPLETE_DAILY_ACTIVITIES: YES
USING TELEPHONE: INDEPENDENT
TAKING MEDICATION: INDEPENDENT
BATHING: INDEPENDENT
NEEDS ASSISTANCE WITH FOOD: INDEPENDENT
MANAGING FINANCES: INDEPENDENT
DOING HOUSEWORK: INDEPENDENT
FEEDING: INDEPENDENT
ADEQUATE_TO_COMPLETE_ADL: YES
PATIENT'S MEMORY ADEQUATE TO SAFELY COMPLETE DAILY ACTIVITIES?: YES
GROCERY SHOPPING: INDEPENDENT
JUDGMENT_ADEQUATE_SAFELY_COMPLETE_DAILY_ACTIVITIES: YES
EATING: INDEPENDENT
DRESSING YOURSELF: INDEPENDENT
STIL DRIVING: YES
BATHING: INDEPENDENT
USING TRANSPORTATION: INDEPENDENT
TOILETING: INDEPENDENT
FEEDING YOURSELF: INDEPENDENT
DRESSING: INDEPENDENT

## 2025-01-03 ASSESSMENT — PAIN SCALES - GENERAL: PAINLEVEL_OUTOF10: 0-NO PAIN

## 2025-01-03 ASSESSMENT — ANXIETY QUESTIONNAIRES
5. BEING SO RESTLESS THAT IT IS HARD TO SIT STILL: NOT AT ALL
3. WORRYING TOO MUCH ABOUT DIFFERENT THINGS: NOT AT ALL
7. FEELING AFRAID AS IF SOMETHING AWFUL MIGHT HAPPEN: NOT AT ALL
GAD7 TOTAL SCORE: 0
6. BECOMING EASILY ANNOYED OR IRRITABLE: NOT AT ALL
4. TROUBLE RELAXING: NOT AT ALL
1. FEELING NERVOUS, ANXIOUS, OR ON EDGE: NOT AT ALL
2. NOT BEING ABLE TO STOP OR CONTROL WORRYING: NOT AT ALL

## 2025-01-03 NOTE — ACP (ADVANCE CARE PLANNING)
Confirming Previous Code Status:   Advance Care Planning Note     Discussion Date: 01/03/25   Discussion Participants: patient    The patient wishes to discuss Advance Care Planning today and the following is a brief summary of our discussion.     Patient has capacity to make their own medical decisions: Yes  Health Care Agent/Surrogate Decision Maker documented in chart: Yes    Documents on file and valid:  Advance Directive/Living Will: Yes   Health Care Power of : Yes  Other: none    Communication of Medical Status/Prognosis:   yes     Communication of Treatment Goals/Options:   yes     Treatment Decisions  Goals of Care: survival is paramount regardless of prognosis, treatment outcome, or burden   yes  Follow Up Plan  no  Team Members  myself  Time Statement: Total face to face time spent on advance care planning was 16 minutes with 16 minutes spent in counseling, including the explanation.    Alvin Langston,   1/3/2025 11:30 AM

## 2025-01-03 NOTE — PROGRESS NOTES
"Subjective   Patient ID: Giulia Dai is a 84 y.o. female who presents for Annual Exam (Annual medicare wellness fasting bw).    HPI     Review of Systems   Constitutional: Negative.    HENT: Negative.     Respiratory: Negative.     Cardiovascular: Negative.         Dr Morrow   Gastrointestinal: Negative.    Endocrine: Negative.         Thyroid disease   Genitourinary: Negative.         Dr Jacobson   Musculoskeletal:  Positive for arthralgias.   Neurological: Negative.    Psychiatric/Behavioral: Negative.         Objective   /76 (BP Location: Left arm)   Temp 36.2 °C (97.2 °F) (Temporal)   Ht 1.676 m (5' 6\")   Wt 64.1 kg (141 lb 6.4 oz)   BMI 22.82 kg/m²     Physical Exam  Vitals and nursing note reviewed.   Constitutional:       Appearance: Normal appearance.   HENT:      Right Ear: Tympanic membrane normal.      Left Ear: Tympanic membrane normal.      Mouth/Throat:      Pharynx: Oropharynx is clear.   Cardiovascular:      Rate and Rhythm: Normal rate and regular rhythm.      Pulses: Normal pulses.      Heart sounds: Normal heart sounds.   Pulmonary:      Effort: Pulmonary effort is normal.      Breath sounds: Normal breath sounds.   Abdominal:      Palpations: Abdomen is soft.   Musculoskeletal:         General: Normal range of motion.   Neurological:      General: No focal deficit present.      Mental Status: She is alert and oriented to person, place, and time.   Psychiatric:         Mood and Affect: Mood normal.         Behavior: Behavior normal.         Assessment/Plan patient seen here for an annual Medicare wellness exam.  We reviewed her questionnaire she is agreeable to her responses.  We did discuss advanced directives.  She has no significant difficulty with depression or anxiety.  We are drawing her lab work here today she continues to follow with cardiology and nephrology.  She has a requisition for mammography we are giving her her flu vaccine I will see her back in a year for physical " and as needed for medications  Problem List Items Addressed This Visit             ICD-10-CM    Adult hypothyroidism E03.9    Relevant Orders    TSH with reflex to Free T4 if abnormal    Hyperlipidemia E78.5    Relevant Orders    Lipid Panel    Hypertension I10    Relevant Orders    CBC and Auto Differential    Comprehensive Metabolic Panel    Stage 3b chronic kidney disease (Multi) N18.32    Relevant Orders    Albumin-Creatinine Ratio, Urine Random    Paroxysmal A-fib (Multi) I48.0     Other Visit Diagnoses         Codes    Medicare annual wellness visit, subsequent    -  Primary Z00.00    BMI 22.0-22.9, adult     Z68.22    Screening mammogram for breast cancer     Z12.31    Relevant Orders    BI mammo bilateral screening tomosynthesis

## 2025-01-13 ENCOUNTER — HOSPITAL ENCOUNTER (OUTPATIENT)
Dept: RADIOLOGY | Facility: CLINIC | Age: 85
Discharge: HOME | End: 2025-01-13
Payer: MEDICARE

## 2025-01-13 DIAGNOSIS — Z12.31 SCREENING MAMMOGRAM FOR BREAST CANCER: ICD-10-CM

## 2025-01-13 PROCEDURE — 77063 BREAST TOMOSYNTHESIS BI: CPT | Performed by: STUDENT IN AN ORGANIZED HEALTH CARE EDUCATION/TRAINING PROGRAM

## 2025-01-13 PROCEDURE — 77067 SCR MAMMO BI INCL CAD: CPT | Performed by: STUDENT IN AN ORGANIZED HEALTH CARE EDUCATION/TRAINING PROGRAM

## 2025-01-13 PROCEDURE — 77067 SCR MAMMO BI INCL CAD: CPT

## 2025-01-20 DIAGNOSIS — R92.8 ABNORMALITY OF LEFT BREAST ON SCREENING MAMMOGRAM: Primary | ICD-10-CM

## 2025-01-27 DIAGNOSIS — F41.9 ANXIETY: ICD-10-CM

## 2025-01-27 RX ORDER — ALPRAZOLAM 0.5 MG/1
TABLET ORAL
Qty: 60 TABLET | Refills: 2 | Status: SHIPPED | OUTPATIENT
Start: 2025-01-27

## 2025-03-24 DIAGNOSIS — I48.0 PAROXYSMAL A-FIB (MULTI): ICD-10-CM

## 2025-03-24 DIAGNOSIS — I47.19 ATRIAL TACHYCARDIA (CMS-HCC): ICD-10-CM

## 2025-03-25 RX ORDER — RIVAROXABAN 15 MG/1
TABLET, FILM COATED ORAL
Qty: 90 TABLET | Refills: 2 | Status: SHIPPED | OUTPATIENT
Start: 2025-03-25

## 2025-03-28 ENCOUNTER — APPOINTMENT (OUTPATIENT)
Dept: PRIMARY CARE | Facility: CLINIC | Age: 85
End: 2025-03-28
Payer: MEDICARE

## 2025-03-28 VITALS
SYSTOLIC BLOOD PRESSURE: 148 MMHG | BODY MASS INDEX: 22.24 KG/M2 | HEART RATE: 68 BPM | TEMPERATURE: 98.6 F | DIASTOLIC BLOOD PRESSURE: 77 MMHG | HEIGHT: 66 IN | OXYGEN SATURATION: 84 % | WEIGHT: 138.4 LBS

## 2025-03-28 DIAGNOSIS — F41.9 ANXIETY: ICD-10-CM

## 2025-03-28 PROCEDURE — 99213 OFFICE O/P EST LOW 20 MIN: CPT | Performed by: FAMILY MEDICINE

## 2025-03-28 PROCEDURE — 1126F AMNT PAIN NOTED NONE PRSNT: CPT | Performed by: FAMILY MEDICINE

## 2025-03-28 PROCEDURE — G2211 COMPLEX E/M VISIT ADD ON: HCPCS | Performed by: FAMILY MEDICINE

## 2025-03-28 PROCEDURE — 3077F SYST BP >= 140 MM HG: CPT | Performed by: FAMILY MEDICINE

## 2025-03-28 PROCEDURE — 3078F DIAST BP <80 MM HG: CPT | Performed by: FAMILY MEDICINE

## 2025-03-28 RX ORDER — ALPRAZOLAM 0.5 MG/1
0.5 TABLET ORAL 2 TIMES DAILY PRN
Qty: 60 TABLET | Refills: 2 | Status: SHIPPED | OUTPATIENT
Start: 2025-03-28

## 2025-03-28 ASSESSMENT — ENCOUNTER SYMPTOMS
LOSS OF SENSATION IN FEET: 0
OCCASIONAL FEELINGS OF UNSTEADINESS: 0
NERVOUS/ANXIOUS: 1
CONSTITUTIONAL NEGATIVE: 1
DEPRESSION: 0

## 2025-03-28 ASSESSMENT — PATIENT HEALTH QUESTIONNAIRE - PHQ9
SUM OF ALL RESPONSES TO PHQ9 QUESTIONS 1 AND 2: 0
1. LITTLE INTEREST OR PLEASURE IN DOING THINGS: NOT AT ALL
2. FEELING DOWN, DEPRESSED OR HOPELESS: NOT AT ALL

## 2025-03-28 ASSESSMENT — PAIN SCALES - GENERAL: PAINLEVEL_OUTOF10: 0-NO PAIN

## 2025-03-28 NOTE — PROGRESS NOTES
"Subjective   Patient ID: Giulia Dai is a 84 y.o. female who presents for Follow-up (Med refills).   3 month med refills  HPI     Review of Systems   Constitutional: Negative.    Psychiatric/Behavioral:  The patient is nervous/anxious.          in hospice now for pancreatic cancer       Objective   /77 (BP Location: Left arm)   Pulse 68   Temp 37 °C (98.6 °F) (Oral)   Ht 1.676 m (5' 6\")   Wt 62.8 kg (138 lb 6.4 oz)   SpO2 (!) 84%   BMI 22.34 kg/m²     Physical Exam  Vitals and nursing note reviewed.   Constitutional:       Appearance: Normal appearance.   Neurological:      General: No focal deficit present.      Mental Status: She is alert and oriented to person, place, and time.   Psychiatric:         Mood and Affect: Mood normal.         Behavior: Behavior normal.         Assessment/Plan patient seen for refill on her alprazolam this continues to be effective for her she is under some increased stress at the present time her  is now on hospice for his pancreatic cancer will see her back in 3 months  Problem List Items Addressed This Visit             ICD-10-CM    Anxiety F41.9    Relevant Medications    ALPRAZolam (Xanax) 0.5 mg tablet        OARRS:  Alvin Langston,  on 3/28/2025  2:20 PM  I have personally reviewed the OARRS report for Giulia Dai. I have considered the risks of abuse, dependence, addiction and diversion    Is the patient prescribed a combination of a benzodiazepine and opioid?  Yes, I feel it is clincially indicated to continue the medication and have discussed with the patient risks/benefits/alternatives.    Last Urine Drug Screen / ordered today: No  Recent Results (from the past 8760 hours)   Benzodiazepine Confirmation, Urine    Collection Time: 06/24/24  2:26 PM   Result Value Ref Range    Clonazepam <25 <25 ng/mL    7-Aminoclonazepam <25 <25 ng/mL    Alprazolam 57 (H) <25 ng/mL    Alpha-Hydroxyalprazolam 185 (H) <25 ng/mL    Midazolam <25 <25 " ng/mL    Alpha-Hydroxymidazolam <25 <25 ng/mL    Chlordiazepoxide <25 <25 ng/mL    Diazepam <25 <25 ng/mL    Nordiazepam <25 <25 ng/mL    Temazepam <25 <25 ng/mL    Oxazepam <25 <25 ng/mL    Lorazepam <25 <25 ng/mL   Drug Screen, Urine With Reflex to Confirmation    Collection Time: 06/24/24  2:26 PM   Result Value Ref Range    Amphetamine Screen, Urine Presumptive Negative Presumptive Negative    Barbiturate Screen, Urine Presumptive Negative Presumptive Negative    Benzodiazepines Screen, Urine Presumptive Positive (A) Presumptive Negative    Cannabinoid Screen, Urine Presumptive Negative Presumptive Negative    Cocaine Metabolite Screen, Urine Presumptive Negative Presumptive Negative    Fentanyl Screen, Urine Presumptive Negative Presumptive Negative    Opiate Screen, Urine Presumptive Negative Presumptive Negative    Oxycodone Screen, Urine Presumptive Negative Presumptive Negative    PCP Screen, Urine Presumptive Negative Presumptive Negative    Methadone Screen, Urine Presumptive Negative Presumptive Negative     Results are as expected.     Controlled Substance Agreement:  Date of the Last Agreement: 6/24/24  Reviewed Controlled Substance Agreement including but not limited to the benefits, risks, and alternatives to treatment with a Controlled Substance medication(s).    Benzodiazepines:  What is the patient's goal of therapy? anxiety  Is this being achieved with current treatment? yes    SAL-7:  No data recorded    Activities of Daily Living:   Is your overall impression that this patient is benefiting (symptom reduction outweighs side effects) from benzodiazepine therapy? Yes     1. Physical Functioning: Better  2. Family Relationship: Better  3. Social Relationship: Better  4. Mood: Better  5. Sleep Patterns: Better  6. Overall Function: Better

## 2025-04-02 ENCOUNTER — HOSPITAL ENCOUNTER (OUTPATIENT)
Dept: RADIOLOGY | Facility: CLINIC | Age: 85
End: 2025-04-02
Payer: MEDICARE

## 2025-04-02 ENCOUNTER — APPOINTMENT (OUTPATIENT)
Dept: RADIOLOGY | Facility: CLINIC | Age: 85
End: 2025-04-02
Payer: MEDICARE

## 2025-05-02 ENCOUNTER — HOSPITAL ENCOUNTER (OUTPATIENT)
Dept: RADIOLOGY | Facility: CLINIC | Age: 85
Discharge: HOME | End: 2025-05-02
Payer: MEDICARE

## 2025-05-02 DIAGNOSIS — R92.8 ABNORMAL FINDING ON BREAST IMAGING: ICD-10-CM

## 2025-05-02 DIAGNOSIS — R92.8 ABNORMALITY OF LEFT BREAST ON SCREENING MAMMOGRAM: ICD-10-CM

## 2025-05-02 PROCEDURE — 77061 BREAST TOMOSYNTHESIS UNI: CPT | Mod: LT

## 2025-05-02 PROCEDURE — 76642 ULTRASOUND BREAST LIMITED: CPT | Mod: LT

## 2025-05-02 PROCEDURE — 76983 USE EA ADDL TARGET LESION: CPT | Mod: LT

## 2025-05-07 DIAGNOSIS — M10.9 GOUT, UNSPECIFIED: ICD-10-CM

## 2025-05-07 DIAGNOSIS — I10 ESSENTIAL (PRIMARY) HYPERTENSION: ICD-10-CM

## 2025-05-07 RX ORDER — ALLOPURINOL 100 MG/1
200 TABLET ORAL DAILY
Qty: 180 TABLET | Refills: 3 | Status: SHIPPED | OUTPATIENT
Start: 2025-05-07

## 2025-05-07 RX ORDER — HYDROCHLOROTHIAZIDE 25 MG/1
120 TABLET ORAL DAILY
Qty: 90 TABLET | Refills: 2 | Status: SHIPPED | OUTPATIENT
Start: 2025-05-07

## 2025-05-08 PROBLEM — R92.8 ABNORMAL MAMMOGRAM OF LEFT BREAST: Status: ACTIVE | Noted: 2025-05-08

## 2025-05-08 NOTE — PROGRESS NOTES
History Of Present Illness  HPI   The patient is an 84-year-old female who had a recent abnormal left breast screening mammogram followed by abnormal diagnostic mammogram/ultrasound.  She is scheduled for a left breast ultrasound-guided core biopsy.  She has not felt any breast lumps and has no breast pain.  No nipple discharge or retraction.  She reports having a right breast biopsy 2 years ago which was benign.  No family history of breast cancer or any type of cancer.  Age of menarche was 13.  G1, P1 with age at first childbirth of 25.  Menopause at age 42.    The patient's  is in hospice for pancreatic cancer.    Past medical history:  Hypertension  Chronic kidney disease  Paroxysmal atrial fibrillation on Xarelto.  Follows with Dr. Morrow.  Hypothyroid    Past Medical History  She has a past medical history of Old myocardial infarction, Pain in unspecified knee, Personal history of other diseases of the circulatory system, and Personal history of urinary (tract) infections.    She has no past medical history of Personal history of irradiation.    Surgical History  She has a past surgical history that includes Breast biopsy (2021).     Allergies  Patient has no known allergies.    Social History  She reports that she has never smoked. She has never used smokeless tobacco. She reports that she does not drink alcohol and does not use drugs.    Family History  Family History[1]    Review of Systems  Review of Systems:  Constitutional:  no fever, no chills, no significant weight change  Neurological: No history of CVA or seizure disorder  Eyes: No pain, no recent visual change  ENT:  No recent hearing loss  Neck: No pain  Cardiovascular: As above.  No chest pain.  Pulmonary: No shortness of breath, no history of pulmonary disease such as pneumonia or COPD  Breast: As above.  Gastrointestinal:   no abdominal pain, no nausea or vomiting, no constipation or diarrhea or blood in the stool.  No history of  "ulcers.  No liver, gallbladder or pancreas disease.  No intestinal disorder.  Genitourinary: No hematuria or dysuria, no kidney disease  Musculoskeletal:  no arthralgia, no muscle or bone pain  Integumentary:  no rash  Psychiatric: Anxiety  Endocrine:  no history of diabetes  Hematologic/Lymphatic: No easy bruising or bleeding    Last Recorded Vitals  Blood pressure (!) 148/91, pulse 74, temperature 36.8 °C (98.2 °F), resp. rate 18, height 1.676 m (5' 6\"), weight 61.7 kg (136 lb).    Physical Exam  Constitutional: Well-developed, well-nourished, alert and oriented, no acute distress  Skin: Warm and dry, no lesions, no rashes, no jaundice  HEENT: Normocephalic, atraumatic, EOMI, no scleral icterus, mucous membranes moist, no lesions seen  Neck: Soft, nontender, no mass or adenopathy  Cardiac: Regular rate and rhythm, no murmur  Chest: Patent airway, clear to auscultation, normal breath sounds with good chest expansion, no wheezes or rales or rhonchi noted, thorax symmetric  Breast:     right breast: No skin changes, nontender, no mass, mild fibrocystic change    left breast: No skin changes, nontender, no mass, mild fibrocystic change  Abdomen: Nondistended, soft, nontender, no mass  Rectal: Not performed  Extremities: No injury, no lower extremity edema or calf tenderness  Lymphatic: No cervical or axillary adenopathy  Musculoskeletal: Range of motion intact, no joint swelling, normal strength  Neurological: Alert and oriented x3, intact sensory and motor function, no obvious focal neurologic abnormalities  Psychological: Appropriate mood and behavior  Examination chaperoned by Missy    Relevant Results  Labs from April 3, 2025: WBC 9.7, hemoglobin 12.1, platelet 302  Electrolytes normal, BUN 14, creatinine 1.11, glucose 98  Liver function tests normal.    I reviewed the bilateral screening mammogram reports and images from January 16, 2025:  IMPRESSION:  Spiculated left breast mass for which additional " diagnostic  mammography and ultrasound images are recommended. No mammographic  evidence of malignancy in the right breast.  BI-RADS Category:  0 Incomplete; Need Additional Imaging Evaluation  Recommendation:  Additional Imaging.  Recommended Date:  Immediate.  Laterality:  Left.      I reviewed the left breast diagnostic mammogram and ultrasound from May 2, 2025:  IMPRESSION:  Suspicious hypoechoic and spiculated left breast masses as described  highly suggestive of malignancy and for which ultrasound guided  biopsy recommended.    Findings were communicated to the patient.  BI-RADS CATEGORY:  5 Highly Suggestive of Malignancy.  Recommendation:  Surgical Consultation and Biopsy.  Recommended Date:  Immediate.  Laterality:  Left.    Assessment/Plan   Diagnoses and all orders for this visit:  Abnormal mammogram of left breast    84-year-old female with normal exam, but abnormal breast imaging.  2 left breast upper outer quadrant suspicious masses 1.7 and 1.2 cm in diameter.  Left axillary lymph nodes are not suspicious appearing.  History of A-fib on Xarelto.  Patient is scheduled for ultrasound-guided biopsy in 4 days.  Follow-up after biopsy completed.    Jaylen Triana MD         [1]   Family History  Problem Relation Name Age of Onset    Hypertension Other MULTIPLE FAMILY MEMBERS     Cancer Other MULTIPLE FAMILY MEMBERS

## 2025-05-09 ENCOUNTER — OFFICE VISIT (OUTPATIENT)
Dept: SURGERY | Facility: CLINIC | Age: 85
End: 2025-05-09
Payer: MEDICARE

## 2025-05-09 VITALS
HEIGHT: 66 IN | TEMPERATURE: 98.2 F | SYSTOLIC BLOOD PRESSURE: 148 MMHG | BODY MASS INDEX: 21.86 KG/M2 | WEIGHT: 136 LBS | DIASTOLIC BLOOD PRESSURE: 91 MMHG | HEART RATE: 74 BPM | RESPIRATION RATE: 18 BRPM

## 2025-05-09 DIAGNOSIS — R92.8 ABNORMAL MAMMOGRAM OF LEFT BREAST: Primary | ICD-10-CM

## 2025-05-09 PROCEDURE — 1126F AMNT PAIN NOTED NONE PRSNT: CPT | Performed by: SURGERY

## 2025-05-09 PROCEDURE — 3077F SYST BP >= 140 MM HG: CPT | Performed by: SURGERY

## 2025-05-09 PROCEDURE — 99203 OFFICE O/P NEW LOW 30 MIN: CPT | Performed by: SURGERY

## 2025-05-09 PROCEDURE — 3080F DIAST BP >= 90 MM HG: CPT | Performed by: SURGERY

## 2025-05-09 PROCEDURE — 1036F TOBACCO NON-USER: CPT | Performed by: SURGERY

## 2025-05-09 PROCEDURE — 99213 OFFICE O/P EST LOW 20 MIN: CPT | Performed by: SURGERY

## 2025-05-09 PROCEDURE — 1159F MED LIST DOCD IN RCRD: CPT | Performed by: SURGERY

## 2025-05-09 SDOH — ECONOMIC STABILITY: FOOD INSECURITY: WITHIN THE PAST 12 MONTHS, THE FOOD YOU BOUGHT JUST DIDN'T LAST AND YOU DIDN'T HAVE MONEY TO GET MORE.: NEVER TRUE

## 2025-05-09 SDOH — ECONOMIC STABILITY: FOOD INSECURITY: WITHIN THE PAST 12 MONTHS, YOU WORRIED THAT YOUR FOOD WOULD RUN OUT BEFORE YOU GOT MONEY TO BUY MORE.: NEVER TRUE

## 2025-05-09 ASSESSMENT — PAIN SCALES - GENERAL: PAINLEVEL_OUTOF10: 0-NO PAIN

## 2025-05-09 ASSESSMENT — COLUMBIA-SUICIDE SEVERITY RATING SCALE - C-SSRS
2. HAVE YOU ACTUALLY HAD ANY THOUGHTS OF KILLING YOURSELF?: NO
6. HAVE YOU EVER DONE ANYTHING, STARTED TO DO ANYTHING, OR PREPARED TO DO ANYTHING TO END YOUR LIFE?: NO
1. IN THE PAST MONTH, HAVE YOU WISHED YOU WERE DEAD OR WISHED YOU COULD GO TO SLEEP AND NOT WAKE UP?: NO

## 2025-05-09 ASSESSMENT — LIFESTYLE VARIABLES
SKIP TO QUESTIONS 9-10: 1
AUDIT-C TOTAL SCORE: 0
HOW MANY STANDARD DRINKS CONTAINING ALCOHOL DO YOU HAVE ON A TYPICAL DAY: PATIENT DOES NOT DRINK
HOW OFTEN DO YOU HAVE A DRINK CONTAINING ALCOHOL: NEVER
HOW OFTEN DO YOU HAVE SIX OR MORE DRINKS ON ONE OCCASION: NEVER

## 2025-05-09 ASSESSMENT — PATIENT HEALTH QUESTIONNAIRE - PHQ9
1. LITTLE INTEREST OR PLEASURE IN DOING THINGS: NOT AT ALL
2. FEELING DOWN, DEPRESSED OR HOPELESS: NOT AT ALL
SUM OF ALL RESPONSES TO PHQ9 QUESTIONS 1 & 2: 0

## 2025-05-09 ASSESSMENT — ENCOUNTER SYMPTOMS
OCCASIONAL FEELINGS OF UNSTEADINESS: 1
DEPRESSION: 0
LOSS OF SENSATION IN FEET: 0

## 2025-05-09 NOTE — LETTER
May 9, 2025     Alvin Langston DO  [address]  [address]  [facility] [address]    Patient: Giulia Dai   YOB: 1940   Date of Visit: 5/9/2025       Dear Dr. lAvin Langston DO:    Thank you for referring Giulia Dai to me for evaluation. Below are my notes for this consultation.  If you have questions, please do not hesitate to call me. I look forward to following your patient along with you.       Sincerely,     Jaylen Triana MD      CC: No Recipients  ______________________________________________________________________________________    History Of Present Illness  HPI   The patient is an 84-year-old female who had a recent abnormal left breast screening mammogram followed by abnormal diagnostic mammogram/ultrasound.  She is scheduled for a left breast ultrasound-guided core biopsy.  She has not felt any breast lumps and has no breast pain.  No nipple discharge or retraction.  She reports having a right breast biopsy 2 years ago which was benign.  No family history of breast cancer or any type of cancer.  Age of menarche was 13.  G1, P1 with age at first childbirth of 25.  Menopause at age 42.    The patient's  is in hospice for pancreatic cancer.    Past medical history:  Hypertension  Chronic kidney disease  Paroxysmal atrial fibrillation on Xarelto.  Follows with Dr. Morrow.  Hypothyroid    Past Medical History  She has a past medical history of Old myocardial infarction, Pain in unspecified knee, Personal history of other diseases of the circulatory system, and Personal history of urinary (tract) infections.    She has no past medical history of Personal history of irradiation.    Surgical History  She has a past surgical history that includes Breast biopsy (2021).     Allergies  Patient has no known allergies.    Social History  She reports that she has never smoked. She has never used smokeless tobacco. She reports that she does not drink alcohol  "and does not use drugs.    Family History  Family History[1]    Review of Systems  Review of Systems:  Constitutional:  no fever, no chills, no significant weight change  Neurological: No history of CVA or seizure disorder  Eyes: No pain, no recent visual change  ENT:  No recent hearing loss  Neck: No pain  Cardiovascular: As above.  No chest pain.  Pulmonary: No shortness of breath, no history of pulmonary disease such as pneumonia or COPD  Breast: As above.  Gastrointestinal:   no abdominal pain, no nausea or vomiting, no constipation or diarrhea or blood in the stool.  No history of ulcers.  No liver, gallbladder or pancreas disease.  No intestinal disorder.  Genitourinary: No hematuria or dysuria, no kidney disease  Musculoskeletal:  no arthralgia, no muscle or bone pain  Integumentary:  no rash  Psychiatric: Anxiety  Endocrine:  no history of diabetes  Hematologic/Lymphatic: No easy bruising or bleeding    Last Recorded Vitals  Blood pressure (!) 148/91, pulse 74, temperature 36.8 °C (98.2 °F), resp. rate 18, height 1.676 m (5' 6\"), weight 61.7 kg (136 lb).    Physical Exam  Constitutional: Well-developed, well-nourished, alert and oriented, no acute distress  Skin: Warm and dry, no lesions, no rashes, no jaundice  HEENT: Normocephalic, atraumatic, EOMI, no scleral icterus, mucous membranes moist, no lesions seen  Neck: Soft, nontender, no mass or adenopathy  Cardiac: Regular rate and rhythm, no murmur  Chest: Patent airway, clear to auscultation, normal breath sounds with good chest expansion, no wheezes or rales or rhonchi noted, thorax symmetric  Breast:     right breast: No skin changes, nontender, no mass, mild fibrocystic change    left breast: No skin changes, nontender, no mass, mild fibrocystic change  Abdomen: Nondistended, soft, nontender, no mass  Rectal: Not performed  Extremities: No injury, no lower extremity edema or calf tenderness  Lymphatic: No cervical or axillary " adenopathy  Musculoskeletal: Range of motion intact, no joint swelling, normal strength  Neurological: Alert and oriented x3, intact sensory and motor function, no obvious focal neurologic abnormalities  Psychological: Appropriate mood and behavior  Examination chaperoned by Missy    Relevant Results  Labs from April 3, 2025: WBC 9.7, hemoglobin 12.1, platelet 302  Electrolytes normal, BUN 14, creatinine 1.11, glucose 98  Liver function tests normal.    I reviewed the bilateral screening mammogram reports and images from January 16, 2025:  IMPRESSION:  Spiculated left breast mass for which additional diagnostic  mammography and ultrasound images are recommended. No mammographic  evidence of malignancy in the right breast.  BI-RADS Category:  0 Incomplete; Need Additional Imaging Evaluation  Recommendation:  Additional Imaging.  Recommended Date:  Immediate.  Laterality:  Left.      I reviewed the left breast diagnostic mammogram and ultrasound from May 2, 2025:  IMPRESSION:  Suspicious hypoechoic and spiculated left breast masses as described  highly suggestive of malignancy and for which ultrasound guided  biopsy recommended.    Findings were communicated to the patient.  BI-RADS CATEGORY:  5 Highly Suggestive of Malignancy.  Recommendation:  Surgical Consultation and Biopsy.  Recommended Date:  Immediate.  Laterality:  Left.    Assessment/Plan  Diagnoses and all orders for this visit:  Abnormal mammogram of left breast    84-year-old female with normal exam, but abnormal breast imaging.  2 left breast upper outer quadrant suspicious masses 1.7 and 1.2 cm in diameter.  Left axillary lymph nodes are not suspicious appearing.  History of A-fib on Xarelto.  Patient is scheduled for ultrasound-guided biopsy in 4 days.  Follow-up after biopsy completed.    Jaylen Triana MD         [1]  Family History  Problem Relation Name Age of Onset   • Hypertension Other MULTIPLE FAMILY MEMBERS    • Cancer Other MULTIPLE FAMILY  MEMBERS         [1]  Family History  Problem Relation Name Age of Onset   • Hypertension Other MULTIPLE FAMILY MEMBERS    • Cancer Other MULTIPLE FAMILY MEMBERS

## 2025-05-12 ENCOUNTER — APPOINTMENT (OUTPATIENT)
Dept: RADIOLOGY | Facility: CLINIC | Age: 85
End: 2025-05-12
Payer: MEDICARE

## 2025-05-13 ENCOUNTER — HOSPITAL ENCOUNTER (OUTPATIENT)
Dept: RADIOLOGY | Facility: CLINIC | Age: 85
Discharge: HOME | End: 2025-05-13
Payer: MEDICARE

## 2025-05-13 DIAGNOSIS — R92.8 ABNORMAL FINDING ON BREAST IMAGING: ICD-10-CM

## 2025-05-13 PROCEDURE — 77065 DX MAMMO INCL CAD UNI: CPT | Mod: LEFT SIDE | Performed by: RADIOLOGY

## 2025-05-13 PROCEDURE — 19083 BX BREAST 1ST LESION US IMAG: CPT | Mod: LT

## 2025-05-13 PROCEDURE — C1819 TISSUE LOCALIZATION-EXCISION: HCPCS

## 2025-05-13 PROCEDURE — 2780000003 HC OR 278 NO HCPCS

## 2025-05-13 PROCEDURE — C1739 HC OR 272 NO HCPCS: HCPCS

## 2025-05-13 PROCEDURE — 19083 BX BREAST 1ST LESION US IMAG: CPT | Mod: LEFT SIDE | Performed by: RADIOLOGY

## 2025-05-13 PROCEDURE — 2500000004 HC RX 250 GENERAL PHARMACY W/ HCPCS (ALT 636 FOR OP/ED): Mod: JZ | Performed by: RADIOLOGY

## 2025-05-13 PROCEDURE — 2720000007 HC OR 272 NO HCPCS

## 2025-05-13 RX ADMIN — Medication 10 ML: at 13:48

## 2025-05-13 ASSESSMENT — PAIN - FUNCTIONAL ASSESSMENT
PAIN_FUNCTIONAL_ASSESSMENT: 0-10
PAIN_FUNCTIONAL_ASSESSMENT: 0-10

## 2025-05-13 ASSESSMENT — PAIN SCALES - GENERAL
PAINLEVEL_OUTOF10: 0 - NO PAIN

## 2025-05-19 LAB
LAB AP ASR DISCLAIMER: NORMAL
LAB AP BLOCK FOR ADDITIONAL STUDIES: NORMAL
LABORATORY COMMENT REPORT: NORMAL
PATH REPORT.COMMENTS IMP SPEC: NORMAL
PATH REPORT.FINAL DX SPEC: NORMAL
PATH REPORT.GROSS SPEC: NORMAL
PATH REPORT.RELEVANT HX SPEC: NORMAL
PATH REPORT.TOTAL CANCER: NORMAL
PATHOLOGY SYNOPTIC REPORT: NORMAL

## 2025-05-21 NOTE — PROGRESS NOTES
"History Of Present Illness  HPI   May 9, 2025  The patient is an 84-year-old female who had a recent abnormal left breast screening mammogram followed by abnormal diagnostic mammogram/ultrasound.  She is scheduled for a left breast ultrasound-guided core biopsy.  She has not felt any breast lumps and has no breast pain.  No nipple discharge or retraction.  She reports having a right breast biopsy 2 years ago which was benign.  No family history of breast cancer or any type of cancer.  Age of menarche was 13.  G1, P1 with age at first childbirth of 25.  Menopause at age 42.  The patient's  is in hospice for pancreatic cancer.    May 23, 2025  The patient follows up after having left breast core biopsy.  No complaints regarding the biopsy.    Past medical history:  Hypertension  Chronic kidney disease  Paroxysmal atrial fibrillation on Xarelto.  Follows with Dr. Morrow.  Hypothyroid    Past Medical History  She has a past medical history of Old myocardial infarction, Pain in unspecified knee, Personal history of other diseases of the circulatory system, and Personal history of urinary (tract) infections.    She has no past medical history of Personal history of irradiation.    Surgical History  She has a past surgical history that includes Breast biopsy (2021).     Allergies  Patient has no known allergies.    Social History  She reports that she has never smoked. She has never used smokeless tobacco. She reports that she does not drink alcohol and does not use drugs.    Family History  Family History[1]    Last Recorded Vitals  Pulse 83, temperature 36.4 °C (97.6 °F), resp. rate 18, height 1.676 m (5' 6\"), weight 62.6 kg (138 lb).    Physical Exam   Constitutional: Well-developed, well-nourished, alert and oriented, no acute distress  Skin: Warm and dry, no lesions, no rashes, no jaundice  HEENT: Normocephalic, atraumatic, EOMI, no scleral icterus, mucous membranes moist, no lesions seen  Neck: Soft, " nontender, no mass or adenopathy  Cardiac: Regular rate and rhythm, no murmur  Chest: Patent airway, clear to auscultation, normal breath sounds with good chest expansion, no wheezes or rales or rhonchi noted, thorax symmetric  Breast:     right breast: No skin changes, nontender, no mass, mild fibrocystic change    left breast: Biopsy wound healed.  No ecchymosis.  No skin changes, nontender.  Mild thickening at 2 o'clock position at site of biopsy.  Abdomen: Nondistended, soft, nontender, no mass  Rectal: Not performed  Extremities: No injury, no lower extremity edema or calf tenderness  Lymphatic: No cervical or axillary adenopathy  Musculoskeletal: Range of motion intact, no joint swelling, normal strength  Neurological: Alert and oriented x3, intact sensory and motor function, no obvious focal neurologic abnormalities  Psychological: Appropriate mood and behavior  Examination chaperoned by Rosie    Relevant Results  I reviewed the left breast ultrasound and biopsy report and images from May 13, 2025:  Preprocedure scanning demonstrates a hypoechoic shadowing mass at the  2 o'clock position 10 cm from the nipple measuring 1.3 x 1.0 x 1.4  cm. There is no internal Doppler blood flow and the mass is stiff on  elastography. There is shadowing in the tissue contiguous with the  mass but no 2nd discrete mass as seen on ultrasound 05/02/2025. I  believe the previous mass at the 2:30 position 10 cm from the nipple  is contiguous with the mass at 2:00 position 10 cm from the nipple.  On the outside ultrasound, the abnormal area spans a distance of 2.5  cm. I believe 2.5 cm better approximates the extent of abnormality.    Interpreted By:  Marian Crawford,   ADDENDUM:  The final pathology for left breast mass at the 2 o'clock position 10  cm from the nipple is invasive mammary carcinoma with ductal and  lobular features, grade 2. This malignant result is concordant.    Estimated size of mass/extent of disease: The 2 closely  approximated  masses span a distance of 2.5 cm on ultrasound 05/02/2025, which I  believe most accurately indicates the size of the area biopsied.    Ipsilateral lymph nodes:  Were unremarkable on ultrasound of  05/02/2025.    MRI recommendation: At the discretion of the referring surgeon.    Surgical Pathology Exam: [id number]  Order: [id number]   Collected 5/13/2025 14:04       Status: Final result       Dx: Abnormal finding on breast imaging    Test Result Released: No (inaccessible in [facility])    0 Result Notes      Component    FINAL DIAGNOSIS   A.  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    Comment    In this limited sample, the invasive carcinoma measures up to 1 mm in greatest dimension.       E-cadherin immunostain shows variable membranous expression (ranging from intact to reduced membranous expression), supporting invasive carcinoma with ductal and lobular features. AE1/AE2 and CK7 stains are positive.     Representative slides (A1 and E-cadherin stain) were reviewed at the [facility] Breast Consensus Conference via Zoom on 5/19/2025.        Case Summary Report   Breast Biomarker Reporting Template   Protocol posted: 12/13/2023BREAST BIOMARKER REPORTING TEMPLATE - All Specimens  Test(s) Performed     Estrogen Receptor (ER) Status  Positive (greater than 10% of cells demonstrate nuclear positivity)   Percentage of Cells with Nuclear Positivity  61-70%   Average Intensity of Staining  Moderate   Test Type  Food and Drug Administration (FDA) cleared (test / vendor): Roche CONFIRM anti-(ER) (SP1) Rabbit Monoclonal Primary Antibody, Roche  Multimer Detection   Primary Antibody  SP1   Scoring System  No separate scoring system used   Test(s) Performed     Progesterone Receptor (PgR) Status  Positive   Percentage of Cells with Nuclear Positivity  71-80%   Average Intensity of Staining  Strong   Test Type  Food and Drug Administration (FDA) cleared (test / vendor): Roche CONFIRM anti-(MS) (1E2) Rabbit Monoclonal Primary Anitbody, Roche Multimer Detection   Primary Antibody  1E2   Scoring System  No separate scoring system used   Test(s) Performed     HER2 by Immunohistochemistry  Negative (Score 0)   Test Type  Food and Drug Administration (FDA) cleared (test / vendor): Roche Pathway anti-HER-2/arvind (4B5) Rabbit Monoclonal Primary Antibody, Roche Multimer Detection   Primary Antibody  4B5   Cold Ischemia and Fixation Times  Meet requirements specified in latest version of the ASCO / CAP Guidelines   Testing Performed on Block Number(s)  A1   METHODS             Assessment/Plan   Diagnoses and all orders for this visit:  Invasive ductal carcinoma of breast, left  -     Case Request Operating Room: Left breast Magseed localization lumpectomy; Standing  -     CBC; Future  -     Comprehensive Metabolic Panel; Future  -     XR chest 2 views; Future  Other orders  -     Place in outpatient/hospital ambulatory surgery; Standing  -     Full code; Standing  -     NPO Diet Except: Sips with meds; Effective now; Standing  -     Height and weight; Standing  -     Insert and maintain peripheral IV; Standing  -     Saline lock IV; Standing  -     Vital Signs; Standing  -     Pulse oximetry, spot; Standing  -     Apply sequential compression device; Standing  -     ceFAZolin (Ancef) 2 g in dextrose (iso)  mL      84-year-old female with a 2.5 cm diameter left breast mass at 2 o'clock position 10 cm from nipple.  Pathology shows invasive mammary carcinoma with ductal and lobular features, grade 2.  ER 61 to 70%, MS 71 to 80%, HER2 negative.  Axillary lymph nodes  are unremarkable on ultrasound.  Clinical T2 N0.  I discussed mastectomy versus lumpectomy possibly with radiation.  The patient definitely prefers lumpectomy.  I would recommend omission of sentinel lymph node biopsy using choosing wisely guidelines.  I discussed the procedures and risks with the patient and her son. The risks include bleeding, infection, injury to surrounding structures such as nerves/blood vessels, cardiac/pulmonary complications, chronic lymphedema. I discussed the possibility of requiring a second operation following lumpectomy due to positive margins found on the final pathology report.   Refer to medical oncology and radiation oncology postop.  Patient has atrial fibrillation and is taking Xarelto.    Request a cardiac risk assessment note and recommendations for Xarelto from her cardiologist, Dr. Morrow.    Jaylen Triana MD         [1]   Family History  Problem Relation Name Age of Onset    Hypertension Other MULTIPLE FAMILY MEMBERS     Cancer Other MULTIPLE FAMILY MEMBERS

## 2025-05-21 NOTE — H&P (VIEW-ONLY)
"History Of Present Illness  HPI   May 9, 2025  The patient is an 84-year-old female who had a recent abnormal left breast screening mammogram followed by abnormal diagnostic mammogram/ultrasound.  She is scheduled for a left breast ultrasound-guided core biopsy.  She has not felt any breast lumps and has no breast pain.  No nipple discharge or retraction.  She reports having a right breast biopsy 2 years ago which was benign.  No family history of breast cancer or any type of cancer.  Age of menarche was 13.  G1, P1 with age at first childbirth of 25.  Menopause at age 42.  The patient's  is in hospice for pancreatic cancer.    May 23, 2025  The patient follows up after having left breast core biopsy.  No complaints regarding the biopsy.    Past medical history:  Hypertension  Chronic kidney disease  Paroxysmal atrial fibrillation on Xarelto.  Follows with Dr. Morrow.  Hypothyroid    Past Medical History  She has a past medical history of Old myocardial infarction, Pain in unspecified knee, Personal history of other diseases of the circulatory system, and Personal history of urinary (tract) infections.    She has no past medical history of Personal history of irradiation.    Surgical History  She has a past surgical history that includes Breast biopsy (2021).     Allergies  Patient has no known allergies.    Social History  She reports that she has never smoked. She has never used smokeless tobacco. She reports that she does not drink alcohol and does not use drugs.    Family History  Family History[1]    Last Recorded Vitals  Pulse 83, temperature 36.4 °C (97.6 °F), resp. rate 18, height 1.676 m (5' 6\"), weight 62.6 kg (138 lb).    Physical Exam   Constitutional: Well-developed, well-nourished, alert and oriented, no acute distress  Skin: Warm and dry, no lesions, no rashes, no jaundice  HEENT: Normocephalic, atraumatic, EOMI, no scleral icterus, mucous membranes moist, no lesions seen  Neck: Soft, " nontender, no mass or adenopathy  Cardiac: Regular rate and rhythm, no murmur  Chest: Patent airway, clear to auscultation, normal breath sounds with good chest expansion, no wheezes or rales or rhonchi noted, thorax symmetric  Breast:     right breast: No skin changes, nontender, no mass, mild fibrocystic change    left breast: Biopsy wound healed.  No ecchymosis.  No skin changes, nontender.  Mild thickening at 2 o'clock position at site of biopsy.  Abdomen: Nondistended, soft, nontender, no mass  Rectal: Not performed  Extremities: No injury, no lower extremity edema or calf tenderness  Lymphatic: No cervical or axillary adenopathy  Musculoskeletal: Range of motion intact, no joint swelling, normal strength  Neurological: Alert and oriented x3, intact sensory and motor function, no obvious focal neurologic abnormalities  Psychological: Appropriate mood and behavior  Examination chaperoned by Rosie    Relevant Results  I reviewed the left breast ultrasound and biopsy report and images from May 13, 2025:  Preprocedure scanning demonstrates a hypoechoic shadowing mass at the  2 o'clock position 10 cm from the nipple measuring 1.3 x 1.0 x 1.4  cm. There is no internal Doppler blood flow and the mass is stiff on  elastography. There is shadowing in the tissue contiguous with the  mass but no 2nd discrete mass as seen on ultrasound 05/02/2025. I  believe the previous mass at the 2:30 position 10 cm from the nipple  is contiguous with the mass at 2:00 position 10 cm from the nipple.  On the outside ultrasound, the abnormal area spans a distance of 2.5  cm. I believe 2.5 cm better approximates the extent of abnormality.    Interpreted By:  Marian Crawford,   ADDENDUM:  The final pathology for left breast mass at the 2 o'clock position 10  cm from the nipple is invasive mammary carcinoma with ductal and  lobular features, grade 2. This malignant result is concordant.    Estimated size of mass/extent of disease: The 2 closely  approximated  masses span a distance of 2.5 cm on ultrasound 05/02/2025, which I  believe most accurately indicates the size of the area biopsied.    Ipsilateral lymph nodes:  Were unremarkable on ultrasound of  05/02/2025.    MRI recommendation: At the discretion of the referring surgeon.    Surgical Pathology Exam: [id number]  Order: [id number]   Collected 5/13/2025 14:04       Status: Final result       Dx: Abnormal finding on breast imaging    Test Result Released: No (inaccessible in [facility])    0 Result Notes      Component    FINAL DIAGNOSIS   A.  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    Comment    In this limited sample, the invasive carcinoma measures up to 1 mm in greatest dimension.       E-cadherin immunostain shows variable membranous expression (ranging from intact to reduced membranous expression), supporting invasive carcinoma with ductal and lobular features. AE1/AE2 and CK7 stains are positive.     Representative slides (A1 and E-cadherin stain) were reviewed at the [facility] Breast Consensus Conference via Zoom on 5/19/2025.        Case Summary Report   Breast Biomarker Reporting Template   Protocol posted: 12/13/2023BREAST BIOMARKER REPORTING TEMPLATE - All Specimens  Test(s) Performed     Estrogen Receptor (ER) Status  Positive (greater than 10% of cells demonstrate nuclear positivity)   Percentage of Cells with Nuclear Positivity  61-70%   Average Intensity of Staining  Moderate   Test Type  Food and Drug Administration (FDA) cleared (test / vendor): Roche CONFIRM anti-(ER) (SP1) Rabbit Monoclonal Primary Antibody, Roche  Multimer Detection   Primary Antibody  SP1   Scoring System  No separate scoring system used   Test(s) Performed     Progesterone Receptor (PgR) Status  Positive   Percentage of Cells with Nuclear Positivity  71-80%   Average Intensity of Staining  Strong   Test Type  Food and Drug Administration (FDA) cleared (test / vendor): Roche CONFIRM anti-(TN) (1E2) Rabbit Monoclonal Primary Anitbody, Roche Multimer Detection   Primary Antibody  1E2   Scoring System  No separate scoring system used   Test(s) Performed     HER2 by Immunohistochemistry  Negative (Score 0)   Test Type  Food and Drug Administration (FDA) cleared (test / vendor): Roche Pathway anti-HER-2/arvind (4B5) Rabbit Monoclonal Primary Antibody, Roche Multimer Detection   Primary Antibody  4B5   Cold Ischemia and Fixation Times  Meet requirements specified in latest version of the ASCO / CAP Guidelines   Testing Performed on Block Number(s)  A1   METHODS             Assessment/Plan   Diagnoses and all orders for this visit:  Invasive ductal carcinoma of breast, left  -     Case Request Operating Room: Left breast Magseed localization lumpectomy; Standing  -     CBC; Future  -     Comprehensive Metabolic Panel; Future  -     XR chest 2 views; Future  Other orders  -     Place in outpatient/hospital ambulatory surgery; Standing  -     Full code; Standing  -     NPO Diet Except: Sips with meds; Effective now; Standing  -     Height and weight; Standing  -     Insert and maintain peripheral IV; Standing  -     Saline lock IV; Standing  -     Vital Signs; Standing  -     Pulse oximetry, spot; Standing  -     Apply sequential compression device; Standing  -     ceFAZolin (Ancef) 2 g in dextrose (iso)  mL      84-year-old female with a 2.5 cm diameter left breast mass at 2 o'clock position 10 cm from nipple.  Pathology shows invasive mammary carcinoma with ductal and lobular features, grade 2.  ER 61 to 70%, TN 71 to 80%, HER2 negative.  Axillary lymph nodes  are unremarkable on ultrasound.  Clinical T2 N0.  I discussed mastectomy versus lumpectomy possibly with radiation.  The patient definitely prefers lumpectomy.  I would recommend omission of sentinel lymph node biopsy using choosing wisely guidelines.  I discussed the procedures and risks with the patient and her son. The risks include bleeding, infection, injury to surrounding structures such as nerves/blood vessels, cardiac/pulmonary complications, chronic lymphedema. I discussed the possibility of requiring a second operation following lumpectomy due to positive margins found on the final pathology report.   Refer to medical oncology and radiation oncology postop.  Patient has atrial fibrillation and is taking Xarelto.    Request a cardiac risk assessment note and recommendations for Xarelto from her cardiologist, Dr. Morrow.    June 4, 2025 addendum:  I received a note from Dr. Morrow.  The patient is low cardiac risk for operation.  She may hold her Xarelto for 2 days preop and resume postop.    Jaylen Triana MD         [1]   Family History  Problem Relation Name Age of Onset    Hypertension Other MULTIPLE FAMILY MEMBERS     Cancer Other MULTIPLE FAMILY MEMBERS

## 2025-05-23 ENCOUNTER — LAB (OUTPATIENT)
Dept: LAB | Facility: HOSPITAL | Age: 85
End: 2025-05-23
Payer: MEDICARE

## 2025-05-23 ENCOUNTER — OFFICE VISIT (OUTPATIENT)
Dept: SURGERY | Facility: CLINIC | Age: 85
End: 2025-05-23
Payer: MEDICARE

## 2025-05-23 ENCOUNTER — PREP FOR PROCEDURE (OUTPATIENT)
Dept: SURGERY | Facility: CLINIC | Age: 85
End: 2025-05-23

## 2025-05-23 VITALS
SYSTOLIC BLOOD PRESSURE: 167 MMHG | HEART RATE: 83 BPM | TEMPERATURE: 97.6 F | HEIGHT: 66 IN | RESPIRATION RATE: 18 BRPM | WEIGHT: 138 LBS | DIASTOLIC BLOOD PRESSURE: 100 MMHG | BODY MASS INDEX: 22.18 KG/M2

## 2025-05-23 DIAGNOSIS — C50.912 MALIGNANT NEOPLASM OF UNSPECIFIED SITE OF LEFT FEMALE BREAST: Primary | ICD-10-CM

## 2025-05-23 DIAGNOSIS — C50.912 INVASIVE DUCTAL CARCINOMA OF BREAST, LEFT: Primary | ICD-10-CM

## 2025-05-23 LAB
ALBUMIN SERPL BCP-MCNC: 4.4 G/DL (ref 3.4–5)
ALP SERPL-CCNC: 95 U/L (ref 33–136)
ALT SERPL W P-5'-P-CCNC: 13 U/L (ref 7–45)
ANION GAP SERPL CALC-SCNC: 11 MMOL/L (ref 10–20)
AST SERPL W P-5'-P-CCNC: 20 U/L (ref 9–39)
BILIRUB SERPL-MCNC: 0.5 MG/DL (ref 0–1.2)
BUN SERPL-MCNC: 17 MG/DL (ref 6–23)
CALCIUM SERPL-MCNC: 10.3 MG/DL (ref 8.6–10.6)
CHLORIDE SERPL-SCNC: 103 MMOL/L (ref 98–107)
CO2 SERPL-SCNC: 28 MMOL/L (ref 21–32)
CREAT SERPL-MCNC: 1.17 MG/DL (ref 0.5–1.05)
EGFRCR SERPLBLD CKD-EPI 2021: 46 ML/MIN/1.73M*2
ERYTHROCYTE [DISTWIDTH] IN BLOOD BY AUTOMATED COUNT: 14.7 % (ref 11.5–14.5)
GLUCOSE SERPL-MCNC: 98 MG/DL (ref 74–99)
HCT VFR BLD AUTO: 39.6 % (ref 36–46)
HGB BLD-MCNC: 12.9 G/DL (ref 12–16)
MCH RBC QN AUTO: 30.6 PG (ref 26–34)
MCHC RBC AUTO-ENTMCNC: 32.6 G/DL (ref 32–36)
MCV RBC AUTO: 94 FL (ref 80–100)
NRBC BLD-RTO: 0 /100 WBCS (ref 0–0)
PLATELET # BLD AUTO: 266 X10*3/UL (ref 150–450)
POTASSIUM SERPL-SCNC: 4.4 MMOL/L (ref 3.5–5.3)
PROT SERPL-MCNC: 7.4 G/DL (ref 6.4–8.2)
RBC # BLD AUTO: 4.22 X10*6/UL (ref 4–5.2)
SODIUM SERPL-SCNC: 138 MMOL/L (ref 136–145)
WBC # BLD AUTO: 7.9 X10*3/UL (ref 4.4–11.3)

## 2025-05-23 PROCEDURE — 36415 COLL VENOUS BLD VENIPUNCTURE: CPT

## 2025-05-23 PROCEDURE — 1159F MED LIST DOCD IN RCRD: CPT | Performed by: SURGERY

## 2025-05-23 PROCEDURE — 99214 OFFICE O/P EST MOD 30 MIN: CPT | Performed by: SURGERY

## 2025-05-23 PROCEDURE — 3080F DIAST BP >= 90 MM HG: CPT | Performed by: SURGERY

## 2025-05-23 PROCEDURE — 3077F SYST BP >= 140 MM HG: CPT | Performed by: SURGERY

## 2025-05-23 PROCEDURE — 85027 COMPLETE CBC AUTOMATED: CPT

## 2025-05-23 PROCEDURE — 1126F AMNT PAIN NOTED NONE PRSNT: CPT | Performed by: SURGERY

## 2025-05-23 PROCEDURE — 80053 COMPREHEN METABOLIC PANEL: CPT

## 2025-05-23 PROCEDURE — 1036F TOBACCO NON-USER: CPT | Performed by: SURGERY

## 2025-05-23 RX ORDER — CEFAZOLIN SODIUM 2 G/100ML
2 INJECTION, SOLUTION INTRAVENOUS ONCE
OUTPATIENT
Start: 2025-05-23 | End: 2025-05-23

## 2025-05-23 ASSESSMENT — ENCOUNTER SYMPTOMS
OCCASIONAL FEELINGS OF UNSTEADINESS: 0
LOSS OF SENSATION IN FEET: 0
DEPRESSION: 0

## 2025-05-23 ASSESSMENT — PAIN SCALES - GENERAL: PAINLEVEL_OUTOF10: 0-NO PAIN

## 2025-05-23 NOTE — LETTER
May 23, 2025     Alvin Langston DO  [address]  [address]  [facility] [address]    Patient: Giulia Dai   YOB: 1940   Date of Visit: 5/23/2025       Dear Dr. Alvin Langston DO:    Thank you for referring Giulia Dai to me for evaluation. Below are my notes for this consultation.  If you have questions, please do not hesitate to call me. I look forward to following your patient along with you.       Sincerely,     Jaylen Triana MD      CC: Colt Morrow DO  ______________________________________________________________________________________    History Of Present Illness  HPI   May 9, 2025  The patient is an 84-year-old female who had a recent abnormal left breast screening mammogram followed by abnormal diagnostic mammogram/ultrasound.  She is scheduled for a left breast ultrasound-guided core biopsy.  She has not felt any breast lumps and has no breast pain.  No nipple discharge or retraction.  She reports having a right breast biopsy 2 years ago which was benign.  No family history of breast cancer or any type of cancer.  Age of menarche was 13.  G1, P1 with age at first childbirth of 25.  Menopause at age 42.  The patient's  is in hospice for pancreatic cancer.    May 23, 2025  The patient follows up after having left breast core biopsy.  No complaints regarding the biopsy.    Past medical history:  Hypertension  Chronic kidney disease  Paroxysmal atrial fibrillation on Xarelto.  Follows with Dr. Morrow.  Hypothyroid    Past Medical History  She has a past medical history of Old myocardial infarction, Pain in unspecified knee, Personal history of other diseases of the circulatory system, and Personal history of urinary (tract) infections.    She has no past medical history of Personal history of irradiation.    Surgical History  She has a past surgical history that includes Breast biopsy (2021).     Allergies  Patient has no known  "allergies.    Social History  She reports that she has never smoked. She has never used smokeless tobacco. She reports that she does not drink alcohol and does not use drugs.    Family History  Family History[1]    Last Recorded Vitals  Pulse 83, temperature 36.4 °C (97.6 °F), resp. rate 18, height 1.676 m (5' 6\"), weight 62.6 kg (138 lb).    Physical Exam   Constitutional: Well-developed, well-nourished, alert and oriented, no acute distress  Skin: Warm and dry, no lesions, no rashes, no jaundice  HEENT: Normocephalic, atraumatic, EOMI, no scleral icterus, mucous membranes moist, no lesions seen  Neck: Soft, nontender, no mass or adenopathy  Cardiac: Regular rate and rhythm, no murmur  Chest: Patent airway, clear to auscultation, normal breath sounds with good chest expansion, no wheezes or rales or rhonchi noted, thorax symmetric  Breast:     right breast: No skin changes, nontender, no mass, mild fibrocystic change    left breast: Biopsy wound healed.  No ecchymosis.  No skin changes, nontender.  Mild thickening at 2 o'clock position at site of biopsy.  Abdomen: Nondistended, soft, nontender, no mass  Rectal: Not performed  Extremities: No injury, no lower extremity edema or calf tenderness  Lymphatic: No cervical or axillary adenopathy  Musculoskeletal: Range of motion intact, no joint swelling, normal strength  Neurological: Alert and oriented x3, intact sensory and motor function, no obvious focal neurologic abnormalities  Psychological: Appropriate mood and behavior  Examination chaperoned by Rosie    Relevant Results  I reviewed the left breast ultrasound and biopsy report and images from May 13, 2025:  Preprocedure scanning demonstrates a hypoechoic shadowing mass at the  2 o'clock position 10 cm from the nipple measuring 1.3 x 1.0 x 1.4  cm. There is no internal Doppler blood flow and the mass is stiff on  elastography. There is shadowing in the tissue contiguous with the  mass but no 2nd discrete mass as " seen on ultrasound 05/02/2025. I  believe the previous mass at the 2:30 position 10 cm from the nipple  is contiguous with the mass at 2:00 position 10 cm from the nipple.  On the outside ultrasound, the abnormal area spans a distance of 2.5  cm. I believe 2.5 cm better approximates the extent of abnormality.    Interpreted By:  Marian Crawford,   ADDENDUM:  The final pathology for left breast mass at the 2 o'clock position 10  cm from the nipple is invasive mammary carcinoma with ductal and  lobular features, grade 2. This malignant result is concordant.    Estimated size of mass/extent of disease: The 2 closely approximated  masses span a distance of 2.5 cm on ultrasound 05/02/2025, which I  believe most accurately indicates the size of the area biopsied.    Ipsilateral lymph nodes:  Were unremarkable on ultrasound of  05/02/2025.    MRI recommendation: At the discretion of the referring surgeon.    Surgical Pathology Exam: [id number]  Order: [id number]   Collected 5/13/2025 14:04       Status: Final result       Dx: Abnormal finding on breast imaging    Test Result Released: No (inaccessible in [facility])    0 Result Notes      Component    FINAL DIAGNOSIS   A.  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    Comment    In this limited sample, the invasive carcinoma measures up to 1 mm in greatest dimension.       E-cadherin immunostain shows variable membranous expression (ranging from intact to reduced membranous expression), supporting invasive carcinoma with ductal and lobular features. AE1/AE2 and CK7 stains are positive.     Representative  slides (A1 and E-cadherin stain) were reviewed at the [facility] Breast Consensus Conference via Zoom on 5/19/2025.        Case Summary Report   Breast Biomarker Reporting Template   Protocol posted: 12/13/2023BREAST BIOMARKER REPORTING TEMPLATE - All Specimens  Test(s) Performed     Estrogen Receptor (ER) Status  Positive (greater than 10% of cells demonstrate nuclear positivity)   Percentage of Cells with Nuclear Positivity  61-70%   Average Intensity of Staining  Moderate   Test Type  Food and Drug Administration (FDA) cleared (test / vendor): Roche CONFIRM anti-(ER) (SP1) Rabbit Monoclonal Primary Antibody, Roche Multimer Detection   Primary Antibody  SP1   Scoring System  No separate scoring system used   Test(s) Performed     Progesterone Receptor (PgR) Status  Positive   Percentage of Cells with Nuclear Positivity  71-80%   Average Intensity of Staining  Strong   Test Type  Food and Drug Administration (FDA) cleared (test / vendor): Roche CONFIRM anti-(MI) (1E2) Rabbit Monoclonal Primary Anitbody, Roche Multimer Detection   Primary Antibody  1E2   Scoring System  No separate scoring system used   Test(s) Performed     HER2 by Immunohistochemistry  Negative (Score 0)   Test Type  Food and Drug Administration (FDA) cleared (test / vendor): Roche Pathway anti-HER-2/arvind (4B5) Rabbit Monoclonal Primary Antibody, Roche Multimer Detection   Primary Antibody  4B5   Cold Ischemia and Fixation Times  Meet requirements specified in latest version of the ASCO / CAP Guidelines   Testing Performed on Block Number(s)  A1   METHODS             Assessment/Plan  Diagnoses and all orders for this visit:  Invasive ductal carcinoma of breast, left  -     Case Request Operating Room: Left breast Magseed localization lumpectomy; Standing  -     CBC; Future  -     Comprehensive Metabolic Panel; Future  -     XR chest 2 views; Future  Other orders  -     Place in outpatient/hospital ambulatory surgery; Standing  -     Full code;  Standing  -     NPO Diet Except: Sips with meds; Effective now; Standing  -     Height and weight; Standing  -     Insert and maintain peripheral IV; Standing  -     Saline lock IV; Standing  -     Vital Signs; Standing  -     Pulse oximetry, spot; Standing  -     Apply sequential compression device; Standing  -     ceFAZolin (Ancef) 2 g in dextrose (iso)  mL      84-year-old female with a 2.5 cm diameter left breast mass at 2 o'clock position 10 cm from nipple.  Pathology shows invasive mammary carcinoma with ductal and lobular features, grade 2.  ER 61 to 70%, [place] 71 to 80%, HER2 negative.  Axillary lymph nodes are unremarkable on ultrasound.  Clinical T2 N0.  I discussed mastectomy versus lumpectomy possibly with radiation.  The patient definitely prefers lumpectomy.  I would recommend omission of sentinel lymph node biopsy using choosing wisely guidelines.  I discussed the procedures and risks with the patient and her son. The risks include bleeding, infection, injury to surrounding structures such as nerves/blood vessels, cardiac/pulmonary complications, chronic lymphedema. I discussed the possibility of requiring a second operation following lumpectomy due to positive margins found on the final pathology report.   Refer to medical oncology and radiation oncology postop.  Patient has atrial fibrillation and is taking Xarelto.    Request a cardiac risk assessment note and recommendations for Xarelto from her cardiologist, Dr. Morrow.    Jaylen Triana MD         [1]  Family History  Problem Relation Name Age of Onset   • Hypertension Other MULTIPLE FAMILY MEMBERS    • Cancer Other MULTIPLE FAMILY MEMBERS         [1]  Family History  Problem Relation Name Age of Onset   • Hypertension Other MULTIPLE FAMILY MEMBERS    • Cancer Other MULTIPLE FAMILY MEMBERS

## 2025-05-30 DIAGNOSIS — C50.912 INVASIVE DUCTAL CARCINOMA OF BREAST, LEFT: ICD-10-CM

## 2025-06-05 ENCOUNTER — HOSPITAL ENCOUNTER (OUTPATIENT)
Dept: RADIOLOGY | Facility: CLINIC | Age: 85
Discharge: HOME | End: 2025-06-05
Payer: MEDICARE

## 2025-06-05 ENCOUNTER — HOSPITAL ENCOUNTER (OUTPATIENT)
Dept: RADIOLOGY | Facility: HOSPITAL | Age: 85
Discharge: HOME | End: 2025-06-05
Payer: MEDICARE

## 2025-06-05 DIAGNOSIS — C50.912 INVASIVE DUCTAL CARCINOMA OF BREAST, LEFT: ICD-10-CM

## 2025-06-05 PROCEDURE — 71046 X-RAY EXAM CHEST 2 VIEWS: CPT

## 2025-06-05 PROCEDURE — C1739 HC OR 278 NO HCPCS: HCPCS

## 2025-06-05 PROCEDURE — 19285 PERQ DEV BREAST 1ST US IMAG: CPT | Mod: LT

## 2025-06-05 PROCEDURE — 2780000003 HC OR 278 NO HCPCS

## 2025-06-12 NOTE — PREPROCEDURE INSTRUCTIONS
Current Medications    Medication Instructions    allopurinol (Zyloprim) 100 mg tablet Take morning of surgery with sip of water    ALPRAZolam (Xanax) 0.5 mg tablet Ok to take morning of surgery with sip of water if needed for anxiety    atenolol (Tenormin) 50 mg tablet Take morning of surgery with sip of water    levothyroxine (Synthroid, Levoxyl) 25 mcg tablet Take morning of surgery with sip of water    verapamil SR (Calan-SR) 120 mg ER tablet Take morning of surgery with sip of water    Xarelto 15 mg tablet Stop 2 days before surgery. Take last dose Saturday 6/14            NPO Instructions:  Do not eat any food after midnight the night before your surgery.  You may have 13 ounces of clear liquids until TWO hours before ARRIVAL to hospital. This includes water, black tea/coffee, (no milk or cream) apple juice and electrolyte drinks (Gatorade).    The Day before Surgery:  You will be contacted by Surgery Scheduling regarding the time of your arrival to facility and surgery time. If you have not received a call by 2:00 pm, please call [phone]    Day of Surgery:  Enter through the main entrance of [facility], located at [address] [facility]. Proceed to registration, located on the right hand side of the staircase. You will need your ID and insurance card for registration. Please ensure you have a responsible adult to drive you home. A responsible adult DOES NOT include rideshare service drivers (Uber, Lyft, etc), cab drivers, or public transportation drivers, but “provide a ride” is acceptable.    Follow shower instructions provided by surgeon. After your shower avoid lotions, powders, deodorants or anything applied to the skin. If you wear contacts or glasses, wear the glasses. If you do not have glasses, please bring a case for your contacts. You may wear hearing aids and dentures, bring a case for them or we will provide one. Make sure you wear something loose and comfortable. Keep in mind your  surgical procedure and wear something that will accommodate incisions or bandages. Please remove all jewelry and piercing's.     For further questions Luis M BOWDEN can be contacted at [phone] between 7AM-3PM.

## 2025-06-16 ENCOUNTER — ANESTHESIA EVENT (OUTPATIENT)
Dept: OPERATING ROOM | Facility: HOSPITAL | Age: 85
End: 2025-06-16
Payer: MEDICARE

## 2025-06-17 ENCOUNTER — APPOINTMENT (OUTPATIENT)
Dept: RADIOLOGY | Facility: HOSPITAL | Age: 85
End: 2025-06-17
Payer: MEDICARE

## 2025-06-17 ENCOUNTER — PHARMACY VISIT (OUTPATIENT)
Dept: PHARMACY | Facility: CLINIC | Age: 85
End: 2025-06-17
Payer: MEDICARE

## 2025-06-17 ENCOUNTER — HOSPITAL ENCOUNTER (OUTPATIENT)
Facility: HOSPITAL | Age: 85
Setting detail: OUTPATIENT SURGERY
Discharge: HOME | End: 2025-06-17
Attending: SURGERY | Admitting: SURGERY
Payer: MEDICARE

## 2025-06-17 ENCOUNTER — ANESTHESIA (OUTPATIENT)
Dept: OPERATING ROOM | Facility: HOSPITAL | Age: 85
End: 2025-06-17
Payer: MEDICARE

## 2025-06-17 VITALS
OXYGEN SATURATION: 99 % | HEART RATE: 72 BPM | BODY MASS INDEX: 22.18 KG/M2 | RESPIRATION RATE: 18 BRPM | DIASTOLIC BLOOD PRESSURE: 75 MMHG | HEIGHT: 66 IN | TEMPERATURE: 97.7 F | SYSTOLIC BLOOD PRESSURE: 155 MMHG | WEIGHT: 138.01 LBS

## 2025-06-17 DIAGNOSIS — C50.912 INVASIVE DUCTAL CARCINOMA OF BREAST, LEFT: Primary | ICD-10-CM

## 2025-06-17 PROCEDURE — A19301 PR MASTECTOMY, PARTIAL: Performed by: ANESTHESIOLOGIST ASSISTANT

## 2025-06-17 PROCEDURE — 3600000009 HC OR TIME - EACH INCREMENTAL 1 MINUTE - PROCEDURE LEVEL FOUR: Performed by: SURGERY

## 2025-06-17 PROCEDURE — 3700000002 HC GENERAL ANESTHESIA TIME - EACH INCREMENTAL 1 MINUTE: Performed by: SURGERY

## 2025-06-17 PROCEDURE — 2720000007 HC OR 272 NO HCPCS: Performed by: SURGERY

## 2025-06-17 PROCEDURE — 88342 IMHCHEM/IMCYTCHM 1ST ANTB: CPT | Performed by: PATHOLOGY

## 2025-06-17 PROCEDURE — 99100 ANES PT EXTEME AGE<1 YR&>70: CPT | Performed by: ANESTHESIOLOGY

## 2025-06-17 PROCEDURE — 88307 TISSUE EXAM BY PATHOLOGIST: CPT | Mod: TC,PARLAB | Performed by: SURGERY

## 2025-06-17 PROCEDURE — 76098 X-RAY EXAM SURGICAL SPECIMEN: CPT

## 2025-06-17 PROCEDURE — 7100000009 HC PHASE TWO TIME - INITIAL BASE CHARGE: Performed by: SURGERY

## 2025-06-17 PROCEDURE — 19301 PARTIAL MASTECTOMY: CPT | Performed by: SURGERY

## 2025-06-17 PROCEDURE — 7100000010 HC PHASE TWO TIME - EACH INCREMENTAL 1 MINUTE: Performed by: SURGERY

## 2025-06-17 PROCEDURE — 3700000001 HC GENERAL ANESTHESIA TIME - INITIAL BASE CHARGE: Performed by: SURGERY

## 2025-06-17 PROCEDURE — RXMED WILLOW AMBULATORY MEDICATION CHARGE

## 2025-06-17 PROCEDURE — 2500000004 HC RX 250 GENERAL PHARMACY W/ HCPCS (ALT 636 FOR OP/ED)

## 2025-06-17 PROCEDURE — A19301 PR MASTECTOMY, PARTIAL: Performed by: ANESTHESIOLOGY

## 2025-06-17 PROCEDURE — 2500000004 HC RX 250 GENERAL PHARMACY W/ HCPCS (ALT 636 FOR OP/ED): Mod: JW | Performed by: ANESTHESIOLOGIST ASSISTANT

## 2025-06-17 PROCEDURE — 7100000001 HC RECOVERY ROOM TIME - INITIAL BASE CHARGE: Performed by: SURGERY

## 2025-06-17 PROCEDURE — 2500000005 HC RX 250 GENERAL PHARMACY W/O HCPCS: Performed by: SURGERY

## 2025-06-17 PROCEDURE — 7100000002 HC RECOVERY ROOM TIME - EACH INCREMENTAL 1 MINUTE: Performed by: SURGERY

## 2025-06-17 PROCEDURE — 2500000001 HC RX 250 WO HCPCS SELF ADMINISTERED DRUGS (ALT 637 FOR MEDICARE OP): Performed by: ANESTHESIOLOGY

## 2025-06-17 PROCEDURE — 2500000004 HC RX 250 GENERAL PHARMACY W/ HCPCS (ALT 636 FOR OP/ED): Performed by: SURGERY

## 2025-06-17 PROCEDURE — 88307 TISSUE EXAM BY PATHOLOGIST: CPT | Performed by: PATHOLOGY

## 2025-06-17 PROCEDURE — 3600000004 HC OR TIME - INITIAL BASE CHARGE - PROCEDURE LEVEL FOUR: Performed by: SURGERY

## 2025-06-17 RX ORDER — FENTANYL CITRATE 50 UG/ML
INJECTION, SOLUTION INTRAMUSCULAR; INTRAVENOUS AS NEEDED
Status: DISCONTINUED | OUTPATIENT
Start: 2025-06-17 | End: 2025-06-17

## 2025-06-17 RX ORDER — ACETAMINOPHEN 325 MG/1
650 TABLET ORAL EVERY 4 HOURS PRN
Status: DISCONTINUED | OUTPATIENT
Start: 2025-06-17 | End: 2025-06-17 | Stop reason: HOSPADM

## 2025-06-17 RX ORDER — PROPOFOL 10 MG/ML
INJECTION, EMULSION INTRAVENOUS AS NEEDED
Status: DISCONTINUED | OUTPATIENT
Start: 2025-06-17 | End: 2025-06-17

## 2025-06-17 RX ORDER — ONDANSETRON HYDROCHLORIDE 2 MG/ML
INJECTION, SOLUTION INTRAVENOUS AS NEEDED
Status: DISCONTINUED | OUTPATIENT
Start: 2025-06-17 | End: 2025-06-17

## 2025-06-17 RX ORDER — SODIUM CHLORIDE, SODIUM LACTATE, POTASSIUM CHLORIDE, CALCIUM CHLORIDE 600; 310; 30; 20 MG/100ML; MG/100ML; MG/100ML; MG/100ML
INJECTION, SOLUTION INTRAVENOUS CONTINUOUS PRN
Status: DISCONTINUED | OUTPATIENT
Start: 2025-06-17 | End: 2025-06-17

## 2025-06-17 RX ORDER — IPRATROPIUM BROMIDE 0.5 MG/2.5ML
500 SOLUTION RESPIRATORY (INHALATION) ONCE
Status: DISCONTINUED | OUTPATIENT
Start: 2025-06-17 | End: 2025-06-17 | Stop reason: HOSPADM

## 2025-06-17 RX ORDER — PROCHLORPERAZINE EDISYLATE 5 MG/ML
5 INJECTION INTRAMUSCULAR; INTRAVENOUS ONCE AS NEEDED
Status: DISCONTINUED | OUTPATIENT
Start: 2025-06-17 | End: 2025-06-17 | Stop reason: HOSPADM

## 2025-06-17 RX ORDER — METOPROLOL TARTRATE 1 MG/ML
INJECTION, SOLUTION INTRAVENOUS
Status: COMPLETED
Start: 2025-06-17 | End: 2025-06-17

## 2025-06-17 RX ORDER — ALBUTEROL SULFATE 0.83 MG/ML
2.5 SOLUTION RESPIRATORY (INHALATION) ONCE AS NEEDED
Status: DISCONTINUED | OUTPATIENT
Start: 2025-06-17 | End: 2025-06-17 | Stop reason: HOSPADM

## 2025-06-17 RX ORDER — METOPROLOL TARTRATE 1 MG/ML
5 INJECTION, SOLUTION INTRAVENOUS ONCE
Status: COMPLETED | OUTPATIENT
Start: 2025-06-17 | End: 2025-06-17

## 2025-06-17 RX ORDER — SODIUM CHLORIDE, SODIUM LACTATE, POTASSIUM CHLORIDE, CALCIUM CHLORIDE 600; 310; 30; 20 MG/100ML; MG/100ML; MG/100ML; MG/100ML
100 INJECTION, SOLUTION INTRAVENOUS CONTINUOUS
Status: DISCONTINUED | OUTPATIENT
Start: 2025-06-17 | End: 2025-06-17 | Stop reason: HOSPADM

## 2025-06-17 RX ORDER — GABAPENTIN 100 MG/1
100 CAPSULE ORAL 3 TIMES DAILY PRN
Qty: 15 CAPSULE | Refills: 0 | Status: SHIPPED | OUTPATIENT
Start: 2025-06-17 | End: 2026-06-17

## 2025-06-17 RX ORDER — HYDROCODONE BITARTRATE AND ACETAMINOPHEN 5; 325 MG/1; MG/1
1 TABLET ORAL EVERY 6 HOURS PRN
Qty: 5 TABLET | Refills: 0 | Status: SHIPPED | OUTPATIENT
Start: 2025-06-17 | End: 2025-06-24

## 2025-06-17 RX ORDER — LIDOCAINE HYDROCHLORIDE 10 MG/ML
INJECTION, SOLUTION INFILTRATION; PERINEURAL AS NEEDED
Status: DISCONTINUED | OUTPATIENT
Start: 2025-06-17 | End: 2025-06-17 | Stop reason: HOSPADM

## 2025-06-17 RX ORDER — WATER 1 ML/ML
INJECTION IRRIGATION AS NEEDED
Status: DISCONTINUED | OUTPATIENT
Start: 2025-06-17 | End: 2025-06-17 | Stop reason: HOSPADM

## 2025-06-17 RX ORDER — ONDANSETRON HYDROCHLORIDE 2 MG/ML
4 INJECTION, SOLUTION INTRAVENOUS ONCE AS NEEDED
Status: DISCONTINUED | OUTPATIENT
Start: 2025-06-17 | End: 2025-06-17 | Stop reason: HOSPADM

## 2025-06-17 RX ORDER — CEFAZOLIN SODIUM 2 G/50ML
2 SOLUTION INTRAVENOUS ONCE
Status: COMPLETED | OUTPATIENT
Start: 2025-06-17 | End: 2025-06-17

## 2025-06-17 RX ORDER — PHENYLEPHRINE HCL IN 0.9% NACL 1 MG/10 ML
SYRINGE (ML) INTRAVENOUS AS NEEDED
Status: DISCONTINUED | OUTPATIENT
Start: 2025-06-17 | End: 2025-06-17

## 2025-06-17 RX ORDER — BUPIVACAINE HYDROCHLORIDE 5 MG/ML
INJECTION, SOLUTION PERINEURAL AS NEEDED
Status: DISCONTINUED | OUTPATIENT
Start: 2025-06-17 | End: 2025-06-17 | Stop reason: HOSPADM

## 2025-06-17 RX ORDER — LIDOCAINE HYDROCHLORIDE 10 MG/ML
0.1 INJECTION, SOLUTION INFILTRATION; PERINEURAL ONCE
Status: DISCONTINUED | OUTPATIENT
Start: 2025-06-17 | End: 2025-06-17 | Stop reason: HOSPADM

## 2025-06-17 RX ADMIN — ONDANSETRON 4 MG: 2 INJECTION INTRAMUSCULAR; INTRAVENOUS at 08:52

## 2025-06-17 RX ADMIN — METOPROLOL TARTRATE 5 MG: 1 INJECTION, SOLUTION INTRAVENOUS at 07:15

## 2025-06-17 RX ADMIN — CEFAZOLIN SODIUM 2 G: 2 SOLUTION INTRAVENOUS at 07:42

## 2025-06-17 RX ADMIN — PROPOFOL 130 MG: 10 INJECTION, EMULSION INTRAVENOUS at 07:36

## 2025-06-17 RX ADMIN — Medication 100 MCG: at 08:14

## 2025-06-17 RX ADMIN — SODIUM CHLORIDE, POTASSIUM CHLORIDE, SODIUM LACTATE AND CALCIUM CHLORIDE: 600; 310; 30; 20 INJECTION, SOLUTION INTRAVENOUS at 07:27

## 2025-06-17 RX ADMIN — ACETAMINOPHEN 650 MG: 325 TABLET ORAL at 11:00

## 2025-06-17 RX ADMIN — Medication 100 MCG: at 07:53

## 2025-06-17 RX ADMIN — FENTANYL CITRATE 100 MCG: 50 INJECTION, SOLUTION INTRAMUSCULAR; INTRAVENOUS at 07:36

## 2025-06-17 RX ADMIN — METOPROLOL TARTRATE 5 MG: 5 INJECTION INTRAVENOUS at 07:15

## 2025-06-17 RX ADMIN — Medication 100 MCG: at 08:23

## 2025-06-17 RX ADMIN — Medication 100 MCG: at 07:43

## 2025-06-17 RX ADMIN — FENTANYL CITRATE 25 MCG: 50 INJECTION, SOLUTION INTRAMUSCULAR; INTRAVENOUS at 08:48

## 2025-06-17 SDOH — HEALTH STABILITY: MENTAL HEALTH: CURRENT SMOKER: 0

## 2025-06-17 ASSESSMENT — PAIN SCALES - GENERAL
PAIN_LEVEL: 0
PAINLEVEL_OUTOF10: 0 - NO PAIN

## 2025-06-17 ASSESSMENT — PAIN - FUNCTIONAL ASSESSMENT
PAIN_FUNCTIONAL_ASSESSMENT: 0-10
PAIN_FUNCTIONAL_ASSESSMENT: 0-10

## 2025-06-17 NOTE — ANESTHESIA PREPROCEDURE EVALUATION
Patient: Giulia Dai    Procedure Information       Date/Time: 06/17/25 0730    Procedure: LEFT BREAST MAGSEED LOCALIZATION LUMPECTOMY (Left) - Magseed 6/5/25 1400, xarelto is to be held 6/15, 6/16, and to be resumed 1 day after surgery. Pts son is named Al [phone] and will help with scheduling pt.    Location: [facility] / [facility]    Surgeons: Jaylen Triana MD            Relevant Problems   Cardiac   (+) Abnormal electrocardiography   (+) Atrial tachycardia   (+) Bradycardia, sinus   (+) Hyperlipidemia   (+) Hypertension   (+) Paroxysmal A-fib (Multi)      Neuro   (+) Anxiety   (+) Carotid stenosis      /Renal   (+) Nephrolithiasis      Endocrine   (+) Adult hypothyroidism   (+) Hyperparathyroidism due to renal insufficiency (Multi)      HEENT   (+) Acute bacterial sinusitis      ID   (+) Acute bacterial sinusitis      GYN   (+) Invasive ductal carcinoma of breast, left       Clinical information reviewed:    Allergies  Meds  Problems              NPO Detail:  No data recorded     Physical Exam    Airway  Mallampati: II  TM distance: >3 FB  Neck ROM: full  Mouth opening: 3 or more finger widths     Cardiovascular   Rhythm: regular  Rate: abnormal     Dental - normal exam    (+) upper dentures, lower dentures     Pulmonary - normal exam   Abdominal            Anesthesia Plan    History of general anesthesia?: yes  History of complications of general anesthesia?: no    ASA 3     general     The patient is not a current smoker.    intravenous induction   Postoperative pain plan includes opioids.  Trial extubation is planned.  Anesthetic plan and risks discussed with patient.  Use of blood products discussed with patient who.    Plan discussed with [facility]AA.

## 2025-06-17 NOTE — ANESTHESIA POSTPROCEDURE EVALUATION
Patient: Giulia Dai    Procedure Summary       Date: 06/17/25 Room / Location: [facility] OR 02 / [facility]    Anesthesia Start: 0727 Anesthesia Stop: 0916    Procedure: LEFT BREAST MAGSEED LOCALIZATION LUMPECTOMY (Left: Chest) Diagnosis:       Invasive ductal carcinoma of breast, left      (Invasive ductal carcinoma of breast, left [C50.912])    Surgeons: Jaylen Triana MD Responsible Provider: Jere Eagle MD    Anesthesia Type: general ASA Status: 3            Anesthesia Type: general    Vitals Value Taken Time   /77 06/17/25 09:15   Temp 36.5 °C (97.7 °F) 06/17/25 09:15   Pulse 79 06/17/25 09:15   Resp 18 06/17/25 09:15   SpO2 100 % 06/17/25 09:15       Anesthesia Post Evaluation    Patient location during evaluation: PACU  Patient participation: complete - patient participated  Level of consciousness: awake  Pain score: 0  Pain management: adequate  Airway patency: patent  Cardiovascular status: acceptable  Respiratory status: acceptable  Hydration status: acceptable  Postoperative Nausea and Vomiting: none        There were no known notable events for this encounter.     Pt discharged home in stable condition with labor precautions. Pt agreeable with POC. Pt states she is not feeling painful contractions at this time, but is aware of some tightening. +FM, no leaking of fluid or bleeding.

## 2025-06-17 NOTE — ANESTHESIA PROCEDURE NOTES
Airway  Date/Time: 6/17/2025 7:37 AM  Reason: elective    Airway not difficult    Staffing  Performed: AVI   Authorized by: Jere Eagle MD    Performed by: AVI Rosenthal  Patient location during procedure: OR    Patient Condition  Indications for airway management: anesthesia  Planned trial extubation  Sedation level: deep     Final Airway Details   Preoxygenated: yes  Final airway type: supraglottic airway  Successful airway: classic  Size: 4  Number of attempts at approach: 1  Number of other approaches attempted: 0

## 2025-06-18 ENCOUNTER — TELEPHONE (OUTPATIENT)
Dept: SURGERY | Facility: CLINIC | Age: 85
End: 2025-06-18
Payer: MEDICARE

## 2025-06-18 NOTE — TELEPHONE ENCOUNTER
Called to check on patient after her left breast lumpectomy. Left voicemail to call with questions or concerns.

## 2025-06-27 ENCOUNTER — OFFICE VISIT (OUTPATIENT)
Dept: SURGERY | Facility: CLINIC | Age: 85
End: 2025-06-27
Payer: MEDICARE

## 2025-06-27 VITALS
SYSTOLIC BLOOD PRESSURE: 138 MMHG | TEMPERATURE: 95.3 F | DIASTOLIC BLOOD PRESSURE: 65 MMHG | BODY MASS INDEX: 22.34 KG/M2 | WEIGHT: 139 LBS | RESPIRATION RATE: 18 BRPM | HEIGHT: 66 IN | HEART RATE: 53 BPM

## 2025-06-27 DIAGNOSIS — C50.912 INVASIVE DUCTAL CARCINOMA OF BREAST, LEFT: Primary | ICD-10-CM

## 2025-06-27 PROCEDURE — 3078F DIAST BP <80 MM HG: CPT | Performed by: SURGERY

## 2025-06-27 PROCEDURE — 3075F SYST BP GE 130 - 139MM HG: CPT | Performed by: SURGERY

## 2025-06-27 PROCEDURE — 99211 OFF/OP EST MAY X REQ PHY/QHP: CPT | Performed by: SURGERY

## 2025-06-27 PROCEDURE — 1159F MED LIST DOCD IN RCRD: CPT | Performed by: SURGERY

## 2025-06-27 PROCEDURE — 1036F TOBACCO NON-USER: CPT | Performed by: SURGERY

## 2025-06-27 PROCEDURE — 1126F AMNT PAIN NOTED NONE PRSNT: CPT | Performed by: SURGERY

## 2025-06-27 RX ORDER — CEFAZOLIN SODIUM 2 G/100ML
2 INJECTION, SOLUTION INTRAVENOUS ONCE
OUTPATIENT
Start: 2025-06-27 | End: 2025-06-27

## 2025-06-27 ASSESSMENT — ENCOUNTER SYMPTOMS
LOSS OF SENSATION IN FEET: 0
DEPRESSION: 0
OCCASIONAL FEELINGS OF UNSTEADINESS: 0

## 2025-06-27 ASSESSMENT — PAIN SCALES - GENERAL: PAINLEVEL_OUTOF10: 0-NO PAIN

## 2025-06-27 NOTE — PROGRESS NOTES
"History Of Present Illness  HPI    May 9, 2025  The patient is an 84-year-old female who had a recent abnormal left breast screening mammogram followed by abnormal diagnostic mammogram/ultrasound.  She is scheduled for a left breast ultrasound-guided core biopsy.  She has not felt any breast lumps and has no breast pain.  No nipple discharge or retraction.  She reports having a right breast biopsy 2 years ago which was benign.  No family history of breast cancer or any type of cancer.  Age of menarche was 13.  G1, P1 with age at first childbirth of 25.  Menopause at age 42.  The patient's  is in hospice for pancreatic cancer.     June 27, 2025  The patient had left breast Magseed localization lumpectomy on June 17, 2025.  No complaints regarding the operation.    Past medical history:  Hypertension  Chronic kidney disease  Paroxysmal atrial fibrillation on Xarelto.  Follows with Dr. Morrow.  Hypothyroid    Past Medical History  She has a past medical history of Old myocardial infarction, Pain in unspecified knee, Personal history of other diseases of the circulatory system, and Personal history of urinary (tract) infections.    She has no past medical history of Personal history of irradiation.    Surgical History  She has a past surgical history that includes Breast biopsy (2021) and Breast lumpectomy (Left, 06/17/2025).     Allergies  Monosodium glutamate    Social History  She reports that she has never smoked. She has never used smokeless tobacco. She reports that she does not drink alcohol and does not use drugs.    Family History  Family History[1]    Last Recorded Vitals  Blood pressure 138/65, pulse 53, temperature 35.2 °C (95.3 °F), resp. rate 18, height 1.676 m (5' 6\"), weight 63 kg (139 lb).    Physical Exam   Constitutional: Well-developed, well-nourished, alert and oriented, no acute distress  Skin: Warm and dry, no lesions, no rashes, no jaundice  HEENT: Normocephalic, atraumatic, EOMI, no " scleral icterus, mucous membranes moist, no lesions seen  Neck: Soft, nontender, no mass or adenopathy  Cardiac: Regular rate and rhythm, no murmur  Chest: Patent airway, clear to auscultation, normal breath sounds with good chest expansion, no wheezes or rales or rhonchi noted, thorax symmetric  Breast:     right breast: No skin changes, nontender, no mass, mild fibrocystic change    left breast: Upper outer quadrant wound healing well.  No erythema, no drainage, nontender.  Abdomen: Nondistended, soft, nontender, no mass  Rectal: Not performed  Extremities: No injury, no lower extremity edema or calf tenderness  Lymphatic: No cervical or axillary adenopathy  Musculoskeletal: Range of motion intact, no joint swelling, normal strength  Neurological: Alert and oriented x3, intact sensory and motor function, no obvious focal neurologic abnormalities  Psychological: Appropriate mood and behavior  Examination chaperoned by Mignon    Relevant Results  Surgical Pathology Exam: [id number]  Order: [id number]   Collected 6/17/2025 08:14       Status: Final result       Dx: Invasive ductal carcinoma of breast, ...    Test Result Released: Yes (seen)    0 Result Notes       Component  Resulting Agency   FINAL DIAGNOSIS   A. Left breast, magseed localized lumpectomy:  -- Invasive carcinoma with mixed ductal and lobular features, see synoptic report.  -- Lobular neoplasia.  -- Previous biopsy site changes.     B. Left breast new posterior margin, excision:  -- Negative for carcinoma.  -- Atypical lobular hyperplasia.     C. Left breast new superior margin, excision:  -- Invasive carcinoma with predominantly lobular features focally present at the new inked superior margin, see note.     Note: Immunostain for cytokeratin AE1/3 highlights the tumor cells on C11.     D. Left breast new inferior margin, excision:  -- Negative for carcinoma.     E. Left breast new medial margin, excision:  -- Negative for carcinoma.  -- Lobular  neoplasia.     F. Left breast new lateral margin, excision:  -- Negative for carcinoma.         Electronically signed by Sondra South MD on 6/26/2025 at 1447 EDT      [facility]   By the signature on this report, the individual or group listed as making the Final Interpretation/Diagnosis certifies that they have reviewed this case.    Comment  [facility]   : Dr. Berg (C9,C11)   Case Summary Report   INVASIVE CARCINOMA OF THE BREAST: Resection   8th Edition - Protocol posted: 6/19/2024INVASIVE CARCINOMA OF THE BREAST: RESECTION - All Specimens  SPECIMEN   Procedure  Excision (less than total mastectomy)   Specimen Laterality  Left   TUMOR   Tumor Site  Not specified   Histologic Type  Invasive carcinoma with mixed ductal and lobular features   Histologic Grade ([place]iko Histologic Score)     Glandular (Acinar) / Tubular Differentiation  Score 3   Nuclear Pleomorphism  Score 2   Mitotic Rate  Score 1   Overall Grade  Grade 2 (scores of 6 or 7)   Tumor Size  Greatest dimension of largest invasive focus (Millimeters): 17 mm   Tumor Focality  Single focus of invasive carcinoma   Ductal Carcinoma In Situ (DCIS)  Not identified   Lobular Carcinoma In Situ (LCIS)  Present   Lymphatic and / or Vascular Invasion  Not identified   Dermal Lymphatic and / or Vascular Invasion  No skin present   Microcalcifications  Present in non-neoplastic tissue   Treatment Effect in the Breast  No known presurgical therapy   MARGINS   Margin Status for Invasive Carcinoma  Invasive carcinoma present at margin   Margin(s) Involved by Invasive Carcinoma  Superior: invasive carcinoma is focally at the new superior margin (C11)   Distance from Invasive Carcinoma to Other Margin(s)  All other final margins are negative by greater than 2mm   Margin Comment  Margin assessment includes the new margins (specimens B through F)   REGIONAL LYMPH NODES   Regional Lymph Node Status  Not applicable (no regional lymph nodes submitted or  found)   pTNM CLASSIFICATION (AJCC 8th Edition)   Reporting of pT, pN, and (when applicable) pM categories is based on information available to the pathologist at the time the report is issued. As per the AJCC (Chapter 1, 8th Ed.) it is the managing physician's responsibility to establish the final pathologic stage based upon all pertinent information, including but potentially not limited to this pathology report.   pT Category  pT1c   pN Category  pN not assigned (no nodes submitted or found)   ADDITIONAL FINDINGS   Additional Findings  Sclerosing adenosis, micorcysts   SPECIAL STUDIES        Estrogen Receptor (ER) Status  Positive (greater than 10% of cells demonstrate nuclear positivity)   Percentage of Cells with Nuclear Positivity  61-70%        Progesterone Receptor (PgR) Status  Positive   Percentage of Cells with Nuclear Positivity  71-80%        HER2 (by immunohistochemistry)  Negative (Score 0)   Testing Performed on Case Number  [id number]   .             Assessment/Plan   Diagnoses and all orders for this visit:  Invasive ductal carcinoma of breast, left    85-year-old female status post left breast Magseed localization lumpectomy on June 17, 2025.  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  1.7 cm diameter.  Grade 2.  ER 61 to 70%.  KS 71 to 80%.  HER2 negative.  Invasive carcinoma with predominantly lobular features focally present at the new inked superior margin.  Other margins are negative for invasive carcinoma.  Recommend reexcision of the superior margin.  I discussed the procedure and risks.  The risks include bleeding, infection, injury to surrounding structures, cardiac/pulmonary complications.  The patient will need to hold her Xarelto 2 days preop and postop.  Electronic consent obtained.  Refer to medical oncology and radiation oncology.    Jaylen Triana MD         [1]   Family History  Problem Relation Name Age of Onset    Hypertension Other MULTIPLE FAMILY  MEMBERS     Cancer Other MULTIPLE FAMILY MEMBERS

## 2025-06-27 NOTE — H&P (VIEW-ONLY)
"History Of Present Illness  HPI    May 9, 2025  The patient is an 84-year-old female who had a recent abnormal left breast screening mammogram followed by abnormal diagnostic mammogram/ultrasound.  She is scheduled for a left breast ultrasound-guided core biopsy.  She has not felt any breast lumps and has no breast pain.  No nipple discharge or retraction.  She reports having a right breast biopsy 2 years ago which was benign.  No family history of breast cancer or any type of cancer.  Age of menarche was 13.  G1, P1 with age at first childbirth of 25.  Menopause at age 42.  The patient's  is in hospice for pancreatic cancer.     June 27, 2025  The patient had left breast Magseed localization lumpectomy on June 17, 2025.  No complaints regarding the operation.    Past medical history:  Hypertension  Chronic kidney disease  Paroxysmal atrial fibrillation on Xarelto.  Follows with Dr. Morrow.  Hypothyroid    Past Medical History  She has a past medical history of Old myocardial infarction, Pain in unspecified knee, Personal history of other diseases of the circulatory system, and Personal history of urinary (tract) infections.    She has no past medical history of Personal history of irradiation.    Surgical History  She has a past surgical history that includes Breast biopsy (2021) and Breast lumpectomy (Left, 06/17/2025).     Allergies  Monosodium glutamate    Social History  She reports that she has never smoked. She has never used smokeless tobacco. She reports that she does not drink alcohol and does not use drugs.    Family History  Family History[1]    Last Recorded Vitals  Blood pressure 138/65, pulse 53, temperature 35.2 °C (95.3 °F), resp. rate 18, height 1.676 m (5' 6\"), weight 63 kg (139 lb).    Physical Exam   Constitutional: Well-developed, well-nourished, alert and oriented, no acute distress  Skin: Warm and dry, no lesions, no rashes, no jaundice  HEENT: Normocephalic, atraumatic, EOMI, no " scleral icterus, mucous membranes moist, no lesions seen  Neck: Soft, nontender, no mass or adenopathy  Cardiac: Regular rate and rhythm, no murmur  Chest: Patent airway, clear to auscultation, normal breath sounds with good chest expansion, no wheezes or rales or rhonchi noted, thorax symmetric  Breast:     right breast: No skin changes, nontender, no mass, mild fibrocystic change    left breast: Upper outer quadrant wound healing well.  No erythema, no drainage, nontender.  Abdomen: Nondistended, soft, nontender, no mass  Rectal: Not performed  Extremities: No injury, no lower extremity edema or calf tenderness  Lymphatic: No cervical or axillary adenopathy  Musculoskeletal: Range of motion intact, no joint swelling, normal strength  Neurological: Alert and oriented x3, intact sensory and motor function, no obvious focal neurologic abnormalities  Psychological: Appropriate mood and behavior  Examination chaperoned by Mignon    Relevant Results  Surgical Pathology Exam: [id number]  Order: [id number]   Collected 6/17/2025 08:14       Status: Final result       Dx: Invasive ductal carcinoma of breast, ...    Test Result Released: Yes (seen)    0 Result Notes       Component  Resulting Agency   FINAL DIAGNOSIS   A. Left breast, magseed localized lumpectomy:  -- Invasive carcinoma with mixed ductal and lobular features, see synoptic report.  -- Lobular neoplasia.  -- Previous biopsy site changes.     B. Left breast new posterior margin, excision:  -- Negative for carcinoma.  -- Atypical lobular hyperplasia.     C. Left breast new superior margin, excision:  -- Invasive carcinoma with predominantly lobular features focally present at the new inked superior margin, see note.     Note: Immunostain for cytokeratin AE1/3 highlights the tumor cells on C11.     D. Left breast new inferior margin, excision:  -- Negative for carcinoma.     E. Left breast new medial margin, excision:  -- Negative for carcinoma.  -- Lobular  neoplasia.     F. Left breast new lateral margin, excision:  -- Negative for carcinoma.         Electronically signed by Sondra South MD on 6/26/2025 at 1447 EDT      [facility]   By the signature on this report, the individual or group listed as making the Final Interpretation/Diagnosis certifies that they have reviewed this case.    Comment  [facility]   : Dr. Berg (C9,C11)   Case Summary Report   INVASIVE CARCINOMA OF THE BREAST: Resection   8th Edition - Protocol posted: 6/19/2024INVASIVE CARCINOMA OF THE BREAST: RESECTION - All Specimens  SPECIMEN   Procedure  Excision (less than total mastectomy)   Specimen Laterality  Left   TUMOR   Tumor Site  Not specified   Histologic Type  Invasive carcinoma with mixed ductal and lobular features   Histologic Grade ([place] Histologic Score)     Glandular (Acinar) / Tubular Differentiation  Score 3   Nuclear Pleomorphism  Score 2   Mitotic Rate  Score 1   Overall Grade  Grade 2 (scores of 6 or 7)   Tumor Size  Greatest dimension of largest invasive focus (Millimeters): 17 mm   Tumor Focality  Single focus of invasive carcinoma   Ductal Carcinoma In Situ (DCIS)  Not identified   Lobular Carcinoma In Situ (LCIS)  Present   Lymphatic and / or Vascular Invasion  Not identified   Dermal Lymphatic and / or Vascular Invasion  No skin present   Microcalcifications  Present in non-neoplastic tissue   Treatment Effect in the Breast  No known presurgical therapy   MARGINS   Margin Status for Invasive Carcinoma  Invasive carcinoma present at margin   Margin(s) Involved by Invasive Carcinoma  Superior: invasive carcinoma is focally at the new superior margin (C11)   Distance from Invasive Carcinoma to Other Margin(s)  All other final margins are negative by greater than 2mm   Margin Comment  Margin assessment includes the new margins (specimens B through F)   REGIONAL LYMPH NODES   Regional Lymph Node Status  Not applicable (no regional lymph nodes submitted or  found)   pTNM CLASSIFICATION (AJCC 8th Edition)   Reporting of pT, pN, and (when applicable) pM categories is based on information available to the pathologist at the time the report is issued. As per the AJCC (Chapter 1, 8th Ed.) it is the managing physician's responsibility to establish the final pathologic stage based upon all pertinent information, including but potentially not limited to this pathology report.   pT Category  pT1c   pN Category  pN not assigned (no nodes submitted or found)   ADDITIONAL FINDINGS   Additional Findings  Sclerosing adenosis, micorcysts   SPECIAL STUDIES        Estrogen Receptor (ER) Status  Positive (greater than 10% of cells demonstrate nuclear positivity)   Percentage of Cells with Nuclear Positivity  61-70%        Progesterone Receptor (PgR) Status  Positive   Percentage of Cells with Nuclear Positivity  71-80%        HER2 (by immunohistochemistry)  Negative (Score 0)   Testing Performed on Case Number  [id number]   .             Assessment/Plan   Diagnoses and all orders for this visit:  Invasive ductal carcinoma of breast, left    85-year-old female status post left breast Magseed localization lumpectomy on June 17, 2025.  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  1.7 cm diameter.  Grade 2.  ER 61 to 70%.  MT 71 to 80%.  HER2 negative.  Invasive carcinoma with predominantly lobular features focally present at the new inked superior margin.  Other margins are negative for invasive carcinoma.  Recommend reexcision of the superior margin.  I discussed the procedure and risks.  The risks include bleeding, infection, injury to surrounding structures, cardiac/pulmonary complications.  The patient will need to hold her Xarelto 2 days preop and postop.  Electronic consent obtained.  Refer to medical oncology and radiation oncology.    Jaylen Triana MD         [1]   Family History  Problem Relation Name Age of Onset    Hypertension Other MULTIPLE FAMILY  MEMBERS     Cancer Other MULTIPLE FAMILY MEMBERS

## 2025-06-27 NOTE — LETTER
June 27, 2025     Alvin Langston DO  [address]  [address]  [facility] [address]    Patient: Giulia Dai   YOB: 1940   Date of Visit: 6/27/2025       Dear Dr. Alvin Langston DO:    Thank you for referring Giulia Dai to me for evaluation. Below are my notes for this consultation.  If you have questions, please do not hesitate to call me. I look forward to following your patient along with you.       Sincerely,     Jaylen Triana MD      CC: No Recipients  ______________________________________________________________________________________    History Of Present Illness  HPI    May 9, 2025  The patient is an 84-year-old female who had a recent abnormal left breast screening mammogram followed by abnormal diagnostic mammogram/ultrasound.  She is scheduled for a left breast ultrasound-guided core biopsy.  She has not felt any breast lumps and has no breast pain.  No nipple discharge or retraction.  She reports having a right breast biopsy 2 years ago which was benign.  No family history of breast cancer or any type of cancer.  Age of menarche was 13.  G1, P1 with age at first childbirth of 25.  Menopause at age 42.  The patient's  is in hospice for pancreatic cancer.     June 27, 2025  The patient had left breast Magseed localization lumpectomy on June 17, 2025.  No complaints regarding the operation.    Past medical history:  Hypertension  Chronic kidney disease  Paroxysmal atrial fibrillation on Xarelto.  Follows with Dr. Morrow.  Hypothyroid    Past Medical History  She has a past medical history of Old myocardial infarction, Pain in unspecified knee, Personal history of other diseases of the circulatory system, and Personal history of urinary (tract) infections.    She has no past medical history of Personal history of irradiation.    Surgical History  She has a past surgical history that includes Breast biopsy (2021) and Breast lumpectomy (Left,  "06/17/2025).     Allergies  Monosodium glutamate    Social History  She reports that she has never smoked. She has never used smokeless tobacco. She reports that she does not drink alcohol and does not use drugs.    Family History  Family History[1]    Last Recorded Vitals  Blood pressure 138/65, pulse 53, temperature 35.2 °C (95.3 °F), resp. rate 18, height 1.676 m (5' 6\"), weight 63 kg (139 lb).    Physical Exam   Constitutional: Well-developed, well-nourished, alert and oriented, no acute distress  Skin: Warm and dry, no lesions, no rashes, no jaundice  HEENT: Normocephalic, atraumatic, EOMI, no scleral icterus, mucous membranes moist, no lesions seen  Neck: Soft, nontender, no mass or adenopathy  Cardiac: Regular rate and rhythm, no murmur  Chest: Patent airway, clear to auscultation, normal breath sounds with good chest expansion, no wheezes or rales or rhonchi noted, thorax symmetric  Breast:     right breast: No skin changes, nontender, no mass, mild fibrocystic change    left breast: Upper outer quadrant wound healing well.  No erythema, no drainage, nontender.  Abdomen: Nondistended, soft, nontender, no mass  Rectal: Not performed  Extremities: No injury, no lower extremity edema or calf tenderness  Lymphatic: No cervical or axillary adenopathy  Musculoskeletal: Range of motion intact, no joint swelling, normal strength  Neurological: Alert and oriented x3, intact sensory and motor function, no obvious focal neurologic abnormalities  Psychological: Appropriate mood and behavior  Examination chaperoned by Mignon    Relevant Results  Surgical Pathology Exam: [id number]  Order: [id number]   Collected 6/17/2025 08:14       Status: Final result       Dx: Invasive ductal carcinoma of breast, ...    Test Result Released: Yes (seen)    0 Result Notes       Component  Resulting Agency   FINAL DIAGNOSIS   A. Left breast, magseed localized lumpectomy:  -- Invasive carcinoma with mixed ductal and lobular features, see " synoptic report.  -- Lobular neoplasia.  -- Previous biopsy site changes.     B. Left breast new posterior margin, excision:  -- Negative for carcinoma.  -- Atypical lobular hyperplasia.     C. Left breast new superior margin, excision:  -- Invasive carcinoma with predominantly lobular features focally present at the new inked superior margin, see note.     Note: Immunostain for cytokeratin AE1/3 highlights the tumor cells on C11.     D. Left breast new inferior margin, excision:  -- Negative for carcinoma.     E. Left breast new medial margin, excision:  -- Negative for carcinoma.  -- Lobular neoplasia.     F. Left breast new lateral margin, excision:  -- Negative for carcinoma.         Electronically signed by Sondra South MD on 6/26/2025 at 1447 EDT      [facility]   By the signature on this report, the individual or group listed as making the Final Interpretation/Diagnosis certifies that they have reviewed this case.    Comment  [facility]   : Dr. Berg (C9,C11)   Case Summary Report   INVASIVE CARCINOMA OF THE BREAST: Resection   8th Edition - Protocol posted: 6/19/2024INVASIVE CARCINOMA OF THE BREAST: RESECTION - All Specimens  SPECIMEN   Procedure  Excision (less than total mastectomy)   Specimen Laterality  Left   TUMOR   Tumor Site  Not specified   Histologic Type  Invasive carcinoma with mixed ductal and lobular features   Histologic Grade (Murphy Histologic Score)     Glandular (Acinar) / Tubular Differentiation  Score 3   Nuclear Pleomorphism  Score 2   Mitotic Rate  Score 1   Overall Grade  Grade 2 (scores of 6 or 7)   Tumor Size  Greatest dimension of largest invasive focus (Millimeters): 17 mm   Tumor Focality  Single focus of invasive carcinoma   Ductal Carcinoma In Situ (DCIS)  Not identified   Lobular Carcinoma In Situ (LCIS)  Present   Lymphatic and / or Vascular Invasion  Not identified   Dermal Lymphatic and / or Vascular Invasion  No skin present   Microcalcifications   Present in non-neoplastic tissue   Treatment Effect in the Breast  No known presurgical therapy   MARGINS   Margin Status for Invasive Carcinoma  Invasive carcinoma present at margin   Margin(s) Involved by Invasive Carcinoma  Superior: invasive carcinoma is focally at the new superior margin (C11)   Distance from Invasive Carcinoma to Other Margin(s)  All other final margins are negative by greater than 2mm   Margin Comment  Margin assessment includes the new margins (specimens B through F)   REGIONAL LYMPH NODES   Regional Lymph Node Status  Not applicable (no regional lymph nodes submitted or found)   pTNM CLASSIFICATION (AJCC 8th Edition)   Reporting of pT, pN, and (when applicable) pM categories is based on information available to the pathologist at the time the report is issued. As per the AJCC (Chapter 1, 8th Ed.) it is the managing physician's responsibility to establish the final pathologic stage based upon all pertinent information, including but potentially not limited to this pathology report.   pT Category  pT1c   pN Category  pN not assigned (no nodes submitted or found)   ADDITIONAL FINDINGS   Additional Findings  Sclerosing adenosis, micorcysts   SPECIAL STUDIES        Estrogen Receptor (ER) Status  Positive (greater than 10% of cells demonstrate nuclear positivity)   Percentage of Cells with Nuclear Positivity  61-70%        Progesterone Receptor (PgR) Status  Positive   Percentage of Cells with Nuclear Positivity  71-80%        HER2 (by immunohistochemistry)  Negative (Score 0)   Testing Performed on Case Number  [id number]   .             Assessment/Plan  Diagnoses and all orders for this visit:  Invasive ductal carcinoma of breast, left    85-year-old female status post left breast Magseed localization lumpectomy on June 17, 2025.  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  1.7 cm diameter.  Grade 2.  ER 61 to 70%.  VT 71 to 80%.  HER2 negative.  Invasive carcinoma with  predominantly lobular features focally present at the new inked superior margin.  Other margins are negative for invasive carcinoma.  Recommend reexcision of the superior margin.  I discussed the procedure and risks.  The risks include bleeding, infection, injury to surrounding structures, cardiac/pulmonary complications.  The patient will need to hold her Xarelto 2 days preop and postop.  Electronic consent obtained.  Refer to medical oncology and radiation oncology.    Jaylen Triana MD         [1]  Family History  Problem Relation Name Age of Onset   • Hypertension Other MULTIPLE FAMILY MEMBERS    • Cancer Other MULTIPLE FAMILY MEMBERS         [1]  Family History  Problem Relation Name Age of Onset   • Hypertension Other MULTIPLE FAMILY MEMBERS    • Cancer Other MULTIPLE FAMILY MEMBERS

## 2025-06-30 ENCOUNTER — APPOINTMENT (OUTPATIENT)
Dept: PRIMARY CARE | Facility: CLINIC | Age: 85
End: 2025-06-30
Payer: MEDICARE

## 2025-06-30 VITALS
OXYGEN SATURATION: 98 % | BODY MASS INDEX: 21.76 KG/M2 | DIASTOLIC BLOOD PRESSURE: 75 MMHG | HEIGHT: 66 IN | WEIGHT: 135.4 LBS | TEMPERATURE: 98.5 F | SYSTOLIC BLOOD PRESSURE: 145 MMHG | HEART RATE: 81 BPM

## 2025-06-30 DIAGNOSIS — F41.9 ANXIETY: ICD-10-CM

## 2025-06-30 DIAGNOSIS — Z51.81 MEDICATION MONITORING ENCOUNTER: ICD-10-CM

## 2025-06-30 PROCEDURE — 1159F MED LIST DOCD IN RCRD: CPT | Performed by: FAMILY MEDICINE

## 2025-06-30 PROCEDURE — G2211 COMPLEX E/M VISIT ADD ON: HCPCS | Performed by: FAMILY MEDICINE

## 2025-06-30 PROCEDURE — 3077F SYST BP >= 140 MM HG: CPT | Performed by: FAMILY MEDICINE

## 2025-06-30 PROCEDURE — 99213 OFFICE O/P EST LOW 20 MIN: CPT | Performed by: FAMILY MEDICINE

## 2025-06-30 PROCEDURE — 1160F RVW MEDS BY RX/DR IN RCRD: CPT | Performed by: FAMILY MEDICINE

## 2025-06-30 PROCEDURE — 3078F DIAST BP <80 MM HG: CPT | Performed by: FAMILY MEDICINE

## 2025-06-30 PROCEDURE — 1036F TOBACCO NON-USER: CPT | Performed by: FAMILY MEDICINE

## 2025-06-30 PROCEDURE — 1126F AMNT PAIN NOTED NONE PRSNT: CPT | Performed by: FAMILY MEDICINE

## 2025-06-30 RX ORDER — ALPRAZOLAM 0.5 MG/1
0.5 TABLET ORAL 2 TIMES DAILY PRN
Qty: 60 TABLET | Refills: 2 | Status: SHIPPED | OUTPATIENT
Start: 2025-06-30

## 2025-06-30 ASSESSMENT — PATIENT HEALTH QUESTIONNAIRE - PHQ9
1. LITTLE INTEREST OR PLEASURE IN DOING THINGS: NOT AT ALL
2. FEELING DOWN, DEPRESSED OR HOPELESS: NOT AT ALL
SUM OF ALL RESPONSES TO PHQ9 QUESTIONS 1 AND 2: 0

## 2025-06-30 ASSESSMENT — ENCOUNTER SYMPTOMS
DEPRESSION: 0
CONSTITUTIONAL NEGATIVE: 1
NERVOUS/ANXIOUS: 1
OCCASIONAL FEELINGS OF UNSTEADINESS: 0
LOSS OF SENSATION IN FEET: 0

## 2025-06-30 ASSESSMENT — PAIN SCALES - GENERAL: PAINLEVEL_OUTOF10: 0-NO PAIN

## 2025-06-30 NOTE — PROGRESS NOTES
"Subjective   Patient ID: Giulia Dai is a 85 y.o. female who presents for Follow-up (Med refills).   3 month med refills  HPI     Review of Systems   Constitutional: Negative.    Skin:         Recent breast CA diagnosis left Dr Triana   Psychiatric/Behavioral:  The patient is nervous/anxious.          passed away       Objective   /75 (BP Location: Left arm)   Pulse 81   Temp 36.9 °C (98.5 °F) (Oral)   Ht 1.676 m (5' 6\")   Wt 61.4 kg (135 lb 6.4 oz)   SpO2 98%   BMI 21.85 kg/m²     Physical Exam  Vitals and nursing note reviewed.   Constitutional:       Appearance: Normal appearance.   Neurological:      General: No focal deficit present.      Mental Status: She is alert and oriented to person, place, and time.   Psychiatric:         Mood and Affect: Mood normal.         Behavior: Behavior normal.         Assessment/Plan patient seen here for a follow-up on her alprazolam.  Her  did recently passed away also she has been diagnosed with left-sided breast cancer she is seeing general surgery.  Will see her back in 3 months  Problem List Items Addressed This Visit           ICD-10-CM    Anxiety F41.9    Relevant Medications    ALPRAZolam (Xanax) 0.5 mg tablet    Other Relevant Orders    Drug Screen, Urine With Reflex to Confirmation     Other Visit Diagnoses         Codes      Medication monitoring encounter     Z51.81    Relevant Orders    Drug Screen, Urine With Reflex to Confirmation             OARRS:  Alvin Langston,  on 6/30/2025  2:18 PM  I have personally reviewed the OARRS report for Giulia Dai. I have considered the risks of abuse, dependence, addiction and diversion    Is the patient prescribed a combination of a benzodiazepine and opioid?  Yes, I feel it is clincially indicated to continue the medication and have discussed with the patient risks/benefits/alternatives.    Last Urine Drug Screen / ordered today: Yes  No results found for this or any previous visit " (from the past 8760 hours).  Results are as expected.     Controlled Substance Agreement:  Date of the Last Agreement: 6/30/25  Reviewed Controlled Substance Agreement including but not limited to the benefits, risks, and alternatives to treatment with a Controlled Substance medication(s).    Benzodiazepines:  What is the patient's goal of therapy? anxiety  Is this being achieved with current treatment? yes    SAL-7:  No data recorded    Activities of Daily Living:   Is your overall impression that this patient is benefiting (symptom reduction outweighs side effects) from benzodiazepine therapy? Yes     1. Physical Functioning: Better  2. Family Relationship: Better  3. Social Relationship: Better  4. Mood: Better  5. Sleep Patterns: Better  6. Overall Function: Better

## 2025-07-02 LAB
1OH-MIDAZOLAM UR-MCNC: NEGATIVE NG/ML
7AMINOCLONAZEPAM UR-MCNC: NEGATIVE NG/ML
A-OH ALPRAZ UR-MCNC: 550 NG/ML
A-OH-TRIAZOLAM UR-MCNC: NEGATIVE NG/ML
AMPHETAMINES UR QL: NEGATIVE NG/ML
BARBITURATES UR QL: NEGATIVE NG/ML
BENZODIAZ UR QL: POSITIVE NG/ML
BZE UR QL: NEGATIVE NG/ML
CREAT UR-MCNC: 103.5 MG/DL
DRUG SCREEN COMMENT UR-IMP: ABNORMAL
FENTANYL UR QL SCN: NEGATIVE NG/ML
LORAZEPAM UR-MCNC: NEGATIVE NG/ML
METHADONE UR QL: NEGATIVE NG/ML
NORDIAZEPAM UR-MCNC: NEGATIVE NG/ML
OH-ETHYLFLURAZ UR-MCNC: NEGATIVE NG/ML
OPIATES UR QL: NEGATIVE NG/ML
OXAZEPAM UR-MCNC: NEGATIVE NG/ML
OXIDANTS UR QL: NEGATIVE MCG/ML
OXYCODONE UR QL: NEGATIVE NG/ML
PCP UR QL: NEGATIVE NG/ML
PH UR: 6.5 [PH] (ref 4.5–9)
QUEST NOTES AND COMMENTS: ABNORMAL
TEMAZEPAM UR-MCNC: NEGATIVE NG/ML
THC UR QL: NEGATIVE NG/ML

## 2025-07-03 ENCOUNTER — TELEPHONE (OUTPATIENT)
Dept: SURGERY | Facility: CLINIC | Age: 85
End: 2025-07-03
Payer: MEDICARE

## 2025-07-03 NOTE — TELEPHONE ENCOUNTER
Pt called and made aware to hold her xarelto 2 days before her surgery, 7/22, 7/23, and resume one day after surgery 7/25/25. Pt is scheduled for breast surgery 7/24/25. Pt states she understands.

## 2025-07-07 ENCOUNTER — TUMOR BOARD CONFERENCE (OUTPATIENT)
Dept: HEMATOLOGY/ONCOLOGY | Facility: HOSPITAL | Age: 85
End: 2025-07-07
Payer: MEDICARE

## 2025-07-08 NOTE — TUMOR BOARD NOTE
Tumor Board Documentation    Giulia Dai was presented by Jaylen Triana MD, at our Tumor Board on 7/7/2025, which included representatives from Medical oncology, Radiation oncology, Surgical oncology, Pathology, Radiology.    Giulia currently presents as Follow up discussion after surgery with history of the following treatments: 6/17/25 - Left Breast Lumpectomy:  Inv. Carcinoma with Mixed Ductal and Lobular Features, 1.7 cm, Margins Positive.  pT1c, pNX, cM0, ER+, OR+, Her2 - Neg.    Additionally, we reviewed previous medical and familial history, history of present illness, and recent lab results along with all available histopathologic and imaging studies. The tumor board considered available treatment options and made the following recommendations:  Re-Excision for positive margins.  If margins are clear, can consider omitting radiation therapy.  Will need referral to medical oncology for endocrine therapy.        The following procedures/referrals were also placed:  Refer to Radiation Oncology, Medical Oncology.      Clinical Trial Status: Not discussed     National site-specific guidelines were discussed with respect to the case.

## 2025-07-21 NOTE — PREPROCEDURE INSTRUCTIONS
Current Medications    Medication Instructions    allopurinol (Zyloprim) 100 mg tablet Take morning of surgery with sip of water     ALPRAZolam (Xanax) 0.5 mg tablet Continue until night before surgery     atenolol (Tenormin) 50 mg tablet Take morning of surgery with sip of water    levothyroxine (Synthroid, Levoxyl) 25 mcg tablet Take morning of surgery with sip of water    valsartan-hydrochlorothiazide (Diovan-HCT) 80-12.5 mg tablet Hold the day of surgery     verapamil SR (Calan-SR) 120 mg ER tablet Take morning of surgery with sip of water    Xarelto 15 mg tablet Stop 2 days before surgery and hold 2 days after surgery. Last dose 7/21          NPO Instructions:    Do not eat any food after midnight the night before your surgery/procedure.  You may have 13 ounces of clear liquids until TWO hours before your ARRIVAL time to the hospital the day of your surgery/procedure. This includes water, black tea/coffee, (no milk or cream) apple juice and electrolyte drinks (Gatorade-no red colors).  You may chew gum up to TWO hours before your surgery/procedure.    Additional Instructions:     The day before your surgery, you will be receiving a phone call from the surgery schedulers with your times for surgery. If you don't receive a phone call by 2pm, please call [phone].    Enter through the main entrance of [facility], located at [address] Proceed to registration, located on the right hand side of the staircase. You will need your ID and insurance card for registration. Please ensure you have a responsible adult to drive you home. A responsible adult DOES NOT include rideshare service drivers (Uber, Lyft, etc), cab drivers, or public transportation drivers, but “provide a ride” is acceptable.    Take a shower before your procedure. After your shower avoid lotions, powders, deodorants or anything applied to the skin. If you wear contacts or glasses, wear the glasses. If you do not have glasses,  please bring a case for your contacts. You may wear hearing aids and dentures, bring a case for them or we will provide one. Make sure you wear something loose and comfortable. Keep in mind your surgical procedure and wear something that will accommodate incisions or bandages. Please remove all jewelry and piercing's.     For further questions Luis M BOWDEN can be contacted at [phone] between 7AM-3PM.

## 2025-07-24 ENCOUNTER — ANESTHESIA (OUTPATIENT)
Dept: OPERATING ROOM | Facility: HOSPITAL | Age: 85
End: 2025-07-24
Payer: MEDICARE

## 2025-07-24 ENCOUNTER — HOSPITAL ENCOUNTER (OUTPATIENT)
Facility: HOSPITAL | Age: 85
Setting detail: OUTPATIENT SURGERY
Discharge: HOME | End: 2025-07-24
Attending: SURGERY | Admitting: SURGERY
Payer: MEDICARE

## 2025-07-24 ENCOUNTER — ANESTHESIA EVENT (OUTPATIENT)
Dept: OPERATING ROOM | Facility: HOSPITAL | Age: 85
End: 2025-07-24
Payer: MEDICARE

## 2025-07-24 ENCOUNTER — APPOINTMENT (OUTPATIENT)
Dept: CARDIOLOGY | Facility: HOSPITAL | Age: 85
End: 2025-07-24
Payer: MEDICARE

## 2025-07-24 VITALS
OXYGEN SATURATION: 97 % | SYSTOLIC BLOOD PRESSURE: 142 MMHG | HEIGHT: 66 IN | WEIGHT: 134 LBS | BODY MASS INDEX: 21.53 KG/M2 | DIASTOLIC BLOOD PRESSURE: 79 MMHG | HEART RATE: 62 BPM | RESPIRATION RATE: 16 BRPM | TEMPERATURE: 96.8 F

## 2025-07-24 DIAGNOSIS — C50.912 INVASIVE DUCTAL CARCINOMA OF BREAST, LEFT: Primary | ICD-10-CM

## 2025-07-24 PROCEDURE — 7100000009 HC PHASE TWO TIME - INITIAL BASE CHARGE: Performed by: SURGERY

## 2025-07-24 PROCEDURE — 7100000002 HC RECOVERY ROOM TIME - EACH INCREMENTAL 1 MINUTE: Performed by: SURGERY

## 2025-07-24 PROCEDURE — 3700000001 HC GENERAL ANESTHESIA TIME - INITIAL BASE CHARGE: Performed by: SURGERY

## 2025-07-24 PROCEDURE — 3600000009 HC OR TIME - EACH INCREMENTAL 1 MINUTE - PROCEDURE LEVEL FOUR: Performed by: SURGERY

## 2025-07-24 PROCEDURE — 2500000005 HC RX 250 GENERAL PHARMACY W/O HCPCS: Performed by: NURSE ANESTHETIST, CERTIFIED REGISTERED

## 2025-07-24 PROCEDURE — 2720000007 HC OR 272 NO HCPCS: Performed by: SURGERY

## 2025-07-24 PROCEDURE — 3700000002 HC GENERAL ANESTHESIA TIME - EACH INCREMENTAL 1 MINUTE: Performed by: SURGERY

## 2025-07-24 PROCEDURE — 7100000010 HC PHASE TWO TIME - EACH INCREMENTAL 1 MINUTE: Performed by: SURGERY

## 2025-07-24 PROCEDURE — 19301 PARTIAL MASTECTOMY: CPT | Performed by: SURGERY

## 2025-07-24 PROCEDURE — 2500000004 HC RX 250 GENERAL PHARMACY W/ HCPCS (ALT 636 FOR OP/ED): Mod: JW | Performed by: NURSE ANESTHETIST, CERTIFIED REGISTERED

## 2025-07-24 PROCEDURE — 2500000004 HC RX 250 GENERAL PHARMACY W/ HCPCS (ALT 636 FOR OP/ED): Performed by: SURGERY

## 2025-07-24 PROCEDURE — 93010 ELECTROCARDIOGRAM REPORT: CPT | Performed by: INTERNAL MEDICINE

## 2025-07-24 PROCEDURE — 88307 TISSUE EXAM BY PATHOLOGIST: CPT | Mod: TC,PARLAB | Performed by: SURGERY

## 2025-07-24 PROCEDURE — 93005 ELECTROCARDIOGRAM TRACING: CPT | Mod: 59

## 2025-07-24 PROCEDURE — 7100000001 HC RECOVERY ROOM TIME - INITIAL BASE CHARGE: Performed by: SURGERY

## 2025-07-24 PROCEDURE — 2500000004 HC RX 250 GENERAL PHARMACY W/ HCPCS (ALT 636 FOR OP/ED): Performed by: ANESTHESIOLOGIST ASSISTANT

## 2025-07-24 PROCEDURE — 3600000004 HC OR TIME - INITIAL BASE CHARGE - PROCEDURE LEVEL FOUR: Performed by: SURGERY

## 2025-07-24 RX ORDER — NORETHINDRONE AND ETHINYL ESTRADIOL 0.5-0.035
KIT ORAL AS NEEDED
Status: DISCONTINUED | OUTPATIENT
Start: 2025-07-24 | End: 2025-07-24

## 2025-07-24 RX ORDER — LIDOCAINE HCL/PF 100 MG/5ML
SYRINGE (ML) INTRAVENOUS AS NEEDED
Status: DISCONTINUED | OUTPATIENT
Start: 2025-07-24 | End: 2025-07-24

## 2025-07-24 RX ORDER — GLYCOPYRROLATE 0.2 MG/ML
INJECTION INTRAMUSCULAR; INTRAVENOUS AS NEEDED
Status: DISCONTINUED | OUTPATIENT
Start: 2025-07-24 | End: 2025-07-24

## 2025-07-24 RX ORDER — FENTANYL CITRATE 50 UG/ML
INJECTION, SOLUTION INTRAMUSCULAR; INTRAVENOUS AS NEEDED
Status: DISCONTINUED | OUTPATIENT
Start: 2025-07-24 | End: 2025-07-24

## 2025-07-24 RX ORDER — ONDANSETRON HYDROCHLORIDE 2 MG/ML
INJECTION, SOLUTION INTRAVENOUS AS NEEDED
Status: DISCONTINUED | OUTPATIENT
Start: 2025-07-24 | End: 2025-07-24

## 2025-07-24 RX ORDER — PHENYLEPHRINE HCL IN 0.9% NACL 1 MG/10 ML
SYRINGE (ML) INTRAVENOUS AS NEEDED
Status: DISCONTINUED | OUTPATIENT
Start: 2025-07-24 | End: 2025-07-24

## 2025-07-24 RX ORDER — FENTANYL CITRATE 50 UG/ML
50 INJECTION, SOLUTION INTRAMUSCULAR; INTRAVENOUS EVERY 5 MIN PRN
Status: DISCONTINUED | OUTPATIENT
Start: 2025-07-24 | End: 2025-07-24 | Stop reason: HOSPADM

## 2025-07-24 RX ORDER — PROPOFOL 10 MG/ML
INJECTION, EMULSION INTRAVENOUS AS NEEDED
Status: DISCONTINUED | OUTPATIENT
Start: 2025-07-24 | End: 2025-07-24

## 2025-07-24 RX ORDER — CEFAZOLIN SODIUM 2 G/50ML
2 SOLUTION INTRAVENOUS ONCE
Status: COMPLETED | OUTPATIENT
Start: 2025-07-24 | End: 2025-07-24

## 2025-07-24 RX ORDER — FENTANYL CITRATE 50 UG/ML
25 INJECTION, SOLUTION INTRAMUSCULAR; INTRAVENOUS EVERY 5 MIN PRN
Status: DISCONTINUED | OUTPATIENT
Start: 2025-07-24 | End: 2025-07-24 | Stop reason: HOSPADM

## 2025-07-24 RX ORDER — LIDOCAINE HYDROCHLORIDE 10 MG/ML
0.1 INJECTION, SOLUTION INFILTRATION; PERINEURAL ONCE
Status: DISCONTINUED | OUTPATIENT
Start: 2025-07-24 | End: 2025-07-24 | Stop reason: HOSPADM

## 2025-07-24 RX ORDER — SODIUM CHLORIDE, SODIUM LACTATE, POTASSIUM CHLORIDE, CALCIUM CHLORIDE 600; 310; 30; 20 MG/100ML; MG/100ML; MG/100ML; MG/100ML
100 INJECTION, SOLUTION INTRAVENOUS CONTINUOUS
Status: DISCONTINUED | OUTPATIENT
Start: 2025-07-24 | End: 2025-07-24 | Stop reason: HOSPADM

## 2025-07-24 RX ADMIN — Medication 100 MCG: at 13:46

## 2025-07-24 RX ADMIN — DEXAMETHASONE SODIUM PHOSPHATE 4 MG: 4 INJECTION, SOLUTION INTRAMUSCULAR; INTRAVENOUS at 13:26

## 2025-07-24 RX ADMIN — EPHEDRINE SULFATE 10 MG: 50 INJECTION, SOLUTION INTRAVENOUS at 13:18

## 2025-07-24 RX ADMIN — CEFAZOLIN SODIUM 2 G: 2 SOLUTION INTRAVENOUS at 13:10

## 2025-07-24 RX ADMIN — GLYCOPYRROLATE 0.2 MG: 0.2 INJECTION, SOLUTION INTRAMUSCULAR; INTRAVENOUS at 13:24

## 2025-07-24 RX ADMIN — EPHEDRINE SULFATE 10 MG: 50 INJECTION, SOLUTION INTRAVENOUS at 13:23

## 2025-07-24 RX ADMIN — ONDANSETRON 4 MG: 2 INJECTION INTRAMUSCULAR; INTRAVENOUS at 13:56

## 2025-07-24 RX ADMIN — EPHEDRINE SULFATE 5 MG: 50 INJECTION, SOLUTION INTRAVENOUS at 13:21

## 2025-07-24 RX ADMIN — FENTANYL CITRATE 50 MCG: 50 INJECTION, SOLUTION INTRAMUSCULAR; INTRAVENOUS at 13:05

## 2025-07-24 RX ADMIN — LIDOCAINE HYDROCHLORIDE 60 MG: 20 INJECTION INTRAVENOUS at 13:05

## 2025-07-24 RX ADMIN — PROPOFOL 100 MG: 10 INJECTION, EMULSION INTRAVENOUS at 13:05

## 2025-07-24 RX ADMIN — SODIUM CHLORIDE, POTASSIUM CHLORIDE, SODIUM LACTATE AND CALCIUM CHLORIDE: 600; 310; 30; 20 INJECTION, SOLUTION INTRAVENOUS at 13:00

## 2025-07-24 RX ADMIN — EPHEDRINE SULFATE 10 MG: 50 INJECTION, SOLUTION INTRAVENOUS at 13:15

## 2025-07-24 SDOH — HEALTH STABILITY: MENTAL HEALTH: CURRENT SMOKER: 0

## 2025-07-24 ASSESSMENT — COLUMBIA-SUICIDE SEVERITY RATING SCALE - C-SSRS
2. HAVE YOU ACTUALLY HAD ANY THOUGHTS OF KILLING YOURSELF?: NO
1. IN THE PAST MONTH, HAVE YOU WISHED YOU WERE DEAD OR WISHED YOU COULD GO TO SLEEP AND NOT WAKE UP?: NO
6. HAVE YOU EVER DONE ANYTHING, STARTED TO DO ANYTHING, OR PREPARED TO DO ANYTHING TO END YOUR LIFE?: NO

## 2025-07-24 ASSESSMENT — PAIN - FUNCTIONAL ASSESSMENT
PAIN_FUNCTIONAL_ASSESSMENT: 0-10
PAIN_FUNCTIONAL_ASSESSMENT: 0-10

## 2025-07-24 ASSESSMENT — PAIN SCALES - GENERAL
PAINLEVEL_OUTOF10: 0 - NO PAIN
PAIN_LEVEL: 0

## 2025-07-24 NOTE — ANESTHESIA POSTPROCEDURE EVALUATION
Patient: Giulia Dai    Procedure Summary       Date: 07/24/25 Room / Location: [facility] OR 02 / [facility] OR    Anesthesia Start: 1300 Anesthesia Stop: 1412    Procedure: LEFT BREAST LUMPECTOMY/ RE-EXCISION OF SUPERIOR MARGIN (Left: Breast) Diagnosis:       Invasive ductal carcinoma of breast, left      (Invasive ductal carcinoma of breast, left [C50.912])    Surgeons: Jaylen Triana MD Responsible Provider: Nate Muñiz MD    Anesthesia Type: general ASA Status: 3            Anesthesia Type: general    Vitals Value Taken Time   /64 07/24/25 14:11   Temp 36 °C (96.8 °F) 07/24/25 14:11   Pulse 80 07/24/25 14:11   Resp 16 07/24/25 14:11   SpO2 99 % 07/24/25 14:11       Anesthesia Post Evaluation    Patient location during evaluation: PACU  Patient participation: complete - patient participated  Level of consciousness: awake  Pain score: 0  Pain management: adequate  Airway patency: patent  Cardiovascular status: acceptable  Respiratory status: acceptable  Hydration status: acceptable  Postoperative Nausea and Vomiting: none        There were no known notable events for this encounter.

## 2025-07-24 NOTE — OP NOTE
LEFT BREAST LUMPECTOMY/ RE-EXCISION OF SUPERIOR MARGIN (L) Operative Note     Date: 2025  OR Location: [facility]    Name: Giulia Dai, : 1940, Age: 85 y.o., MRN: [id number], Sex: female    Diagnosis  Pre-op Diagnosis      * Invasive ductal carcinoma of breast, left [C50.912] Post-op Diagnosis     * Invasive ductal carcinoma of breast, left [C50.912]     Procedures  LEFT BREAST LUMPECTOMY/ RE-EXCISION OF SUPERIOR MARGIN  [address] - [place] MASTECTOMY PARTIAL      Surgeons      * Jaylen Triana - Primary    Resident/Fellow/Other Assistant:  Surgeons and Role:  * No surgeons found with a matching role *    Staff:   Circulator: Chapis  Circulator: Chapis  Surgical Assistant: Jamila Francisco Person: Cha    Anesthesia Staff: Anesthesiologist: Nate Muñiz MD  CRNA: Nathalie Bell APRN-CRNA  C-AA: AVI Rosenthal    Procedure Summary  Anesthesia: General  ASA: III  Estimated Blood Loss: 0mL  Intra-op Medications:   Administrations occurring from 1220 to 1430 on 25:   Medication Name Total Dose   dexAMETHasone (Decadron) 4 mg/mL IV Syringe 2 mL 4 mg   ePHEDrine injection 35 mg   fentaNYL (Sublimaze) injection 50 mcg/mL 50 mcg   glycopyrrolate (Robinul) injection 0.2 mg   LR bolus Cannot be calculated   lidocaine (cardiac) injection 2% prefilled syringe 60 mg   phenylephrine 100 mcg/mL syringe 10 mL (prefilled) 100 mcg   propofol (Diprivan) injection 10 mg/mL 100 mg   ceFAZolin (Ancef) 2 g in dextrose (iso) IV 50 mL 2 g              Anesthesia Record               Intraprocedure I/O Totals          Intake    ceFAZolin (Ancef) 2 g in dextrose (iso) IV 50 mL 50.00 mL    Total Intake 50 mL          Specimen:   ID Type Source Tests Collected by Time   1 : LEFT BREAST SUPERIOR MARGIN Tissue BREAST MARGIN LEFT SURGICAL PATHOLOGY EXAM Jaylen Triana MD 2025 1347                 Drains and/or Catheters: * None in log *    Tourniquet Times:         Implants:     Findings:  Seroma    Indications: Giulia Dai is an 85 y.o. female who is having surgery for Invasive ductal carcinoma of breast, left [C50.912].  Focally positive superior margin for carcinoma on previous lumpectomy.    The patient was seen in the preoperative area. The risks, benefits, complications, treatment options, non-operative alternatives, expected recovery and outcomes were discussed with the patient. The possibilities of reaction to medication, pulmonary aspiration, injury to surrounding structures, bleeding, recurrent infection, the need for additional procedures, failure to diagnose a condition, and creating a complication requiring transfusion or operation were discussed with the patient. The patient concurred with the proposed plan, giving informed consent.  The site of surgery was properly noted/marked if necessary per policy. The patient has been actively warmed in preoperative area. Preoperative antibiotics have been ordered and given within 1 hours of incision. Venous thrombosis prophylaxis have been ordered including bilateral sequential compression devices    Procedure Details: Following informed consent the patient was taken to the op room placed in supine position.  A huddle was performed.  She was given general LMA anesthetic.  Sequential compression devices were in place and functioning.  She received Ancef IV in the operating room.  Her left breast and surrounding skin were prepped and draped in sterile fashion.  A timeout was performed.  Following ministration of local anesthetic and upper outer quadrant skin incision was made through the previous scar and extended through the subcutaneous tissue.  A seroma cavity was encountered and was aspirated.  The entire superior half of the lumpectomy margin was reexcised.  The new margins were marked with margin marker with red ink superior, green ink anterior, orange ink lateral, yellow ink medial and black posterior.  The wound was irrigated with  saline.  Hemostasis appeared to be excellent.  Half percent plain Marcaine was infiltrated.  The wound was closed in layers with interrupted 3-0 Vicryl subcutaneous sutures and a running 4-0 Vicryl subcuticular skin stitch.  Dermabond was placed followed by fluffs and a mastectomy bra.  The patient was taken to the recovery room in satisfactory condition having tolerated the procedure well.  Counts were correct.  Evidence of Infection: No   Complications:  None; patient tolerated the procedure well.    Disposition: PACU - hemodynamically stable.  Condition: stable     Physician assistant performed  Retraction and skin closure          Jaylen Triana  Phone Number: [phone]

## 2025-07-24 NOTE — ANESTHESIA PROCEDURE NOTES
Airway  Date/Time: 7/24/2025 1:08 PM  Reason: elective    Airway not difficult    Staffing  Performed: CRNA   Authorized by: Nate Muñiz MD    Performed by: PAULA Lipscomb  Patient location during procedure: OR    Patient Condition  Indications for airway management: anesthesia  Patient position: sniffing  MILS maintained throughout  Planned trial extubation  Sedation level: deep     Final Airway Details   Preoxygenated: yes  Final airway type: supraglottic airway  Successful airway: unique  Size: 3   Ventilation between attempts: none  Number of attempts at approach: 1  Number of other approaches attempted: 0

## 2025-07-24 NOTE — ANESTHESIA PREPROCEDURE EVALUATION
Patient: Giulia Dai    Procedure Information       Date/Time: 07/24/25 1220    Procedure: LEFT BREAST LUMPECTOMY/ RE-EXCISION OF SUPERIOR MARGIN (Left) - Pt is on Xarelto. Cardiac clearance to hold will be obtained. Son is Al [phone].    Location: [facility] OR 02 / [facility]    Surgeons: Jaylen Triana MD            Relevant Problems   Anesthesia (within normal limits)      Cardiac   (+) Abnormal electrocardiography   (+) Atrial tachycardia   (+) Bradycardia, sinus   (+) Hyperlipidemia   (+) Hypertension   (+) Paroxysmal A-fib (Multi)      Neuro   (+) Anxiety   (+) Carotid stenosis      /Renal   (+) Nephrolithiasis      Endocrine   (+) Adult hypothyroidism   (+) Hyperparathyroidism due to renal insufficiency (Multi)      HEENT   (+) Acute bacterial sinusitis      ID   (+) Acute bacterial sinusitis      GYN   (+) Invasive ductal carcinoma of breast, left       Clinical information reviewed:   Tobacco  Allergies  Meds   Med Hx  Surg Hx  OB Status  Fam Hx  Soc   Hx        NPO Detail:  NPO/Void Status  Date of Last Liquid: 07/24/25  Time of Last Liquid: 1000 (water)  Date of Last Solid: 07/23/25  Time of Last Solid: 1800         Physical Exam    Airway  Mallampati: II  TM distance: >3 FB  Neck ROM: full  Mouth opening: 3 or more finger widths     Cardiovascular - normal exam   Dental     (+) upper dentures, lower dentures     Pulmonary - normal exam   Abdominal - normal exam           Anesthesia Plan    History of general anesthesia?: yes  History of complications of general anesthesia?: no    ASA 3     general     The patient is not a current smoker.  Patient was previously instructed to abstain from smoking on day of procedure.  Patient did not smoke on day of procedure.    intravenous induction   Postoperative pain plan includes opioids.  Trial extubation is planned.  Anesthetic plan and risks discussed with patient.  Use of blood products discussed with patient who consented to blood  products.    Plan discussed with CRNA.

## 2025-07-25 ENCOUNTER — TELEPHONE (OUTPATIENT)
Dept: SURGERY | Facility: CLINIC | Age: 85
End: 2025-07-25
Payer: MEDICARE

## 2025-07-25 LAB
ATRIAL RATE: 85 BPM
Q ONSET: 223 MS
QRS COUNT: 12 BEATS
QRS DURATION: 82 MS
QT INTERVAL: 390 MS
QTC CALCULATION(BAZETT): 432 MS
QTC FREDERICIA: 418 MS
R AXIS: -1 DEGREES
T AXIS: -22 DEGREES
T OFFSET: 418 MS
VENTRICULAR RATE: 74 BPM

## 2025-07-25 NOTE — TELEPHONE ENCOUNTER
Pt has left breast re excision 7/24/25. Left message to see how her surgery went. Pt is to call with questions or any issues.

## 2025-07-31 LAB
LAB AP ASR DISCLAIMER: NORMAL
LABORATORY COMMENT REPORT: NORMAL
PATH REPORT.COMMENTS IMP SPEC: NORMAL
PATH REPORT.FINAL DX SPEC: NORMAL
PATH REPORT.GROSS SPEC: NORMAL
PATH REPORT.RELEVANT HX SPEC: NORMAL
PATH REPORT.TOTAL CANCER: NORMAL

## 2025-08-06 NOTE — PROGRESS NOTES
History Of Present Illness  HPI     May 9, 2025  The patient is an 84-year-old female who had a recent abnormal left breast screening mammogram followed by abnormal diagnostic mammogram/ultrasound.  She is scheduled for a left breast ultrasound-guided core biopsy.  She has not felt any breast lumps and has no breast pain.  No nipple discharge or retraction.  She reports having a right breast biopsy 2 years ago which was benign.  No family history of breast cancer or any type of cancer.  Age of menarche was 13.  G1, P1 with age at first childbirth of 25.  Menopause at age 42.  The patient's  is in hospice for pancreatic cancer.     August 8, 2025  85-year-old female status post left breast Magseed localization lumpectomy on June 17, 2025.  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  1.7 cm diameter.  Grade 2.  ER 61 to 70%.  [place] 71 to 80%.  HER2 negative.  Invasive carcinoma with predominantly lobular features focally present at the new inked superior margin.  Other margins are negative for invasive carcinoma.  She follows up after having reexcision of the superior margin on July 24, 2025.  I reexcised the entire superior half of the prior lumpectomy margin.  No complaints regarding the operation.     Past medical history:  Hypertension  Chronic kidney disease  Paroxysmal atrial fibrillation on Xarelto.  Follows with Dr. Morrow.  Hypothyroid    Past Medical History  She has a past medical history of Old myocardial infarction, Pain in unspecified knee, Personal history of other diseases of the circulatory system, and Personal history of urinary (tract) infections.    She has no past medical history of Personal history of irradiation.    Surgical History  She has a past surgical history that includes Breast biopsy (2021) and Breast lumpectomy (Left, 06/17/2025).     Allergies  Monosodium glutamate    Social History  She reports that she has never smoked. She has never used smokeless  "tobacco. She reports that she does not drink alcohol and does not use drugs.    Family History  Family History[1]    Last Recorded Vitals  Blood pressure 160/79, pulse 63, temperature 36.2 °C (97.1 °F), resp. rate 18, height 1.676 m (5' 6\"), weight 61.2 kg (135 lb), SpO2 98%.    Physical Exam  General: Well-developed, well-nourished, no acute distress, alert and oriented  Wound: Intact, no erythema or signs of infection.  Seroma present.  Examination chaperoned by Rosie.    Relevant Results  Surgical Pathology Exam: [id number]  Order: [id number]   Collected 7/24/2025 13:47       Status: Final result       Dx: Invasive ductal carcinoma of breast, ...    Test Result Released: Yes (seen)    0 Result Notes      Component    FINAL DIAGNOSIS   A.  Left breast superior margin, excision:    -- Breast tissue with surgical site changes, negative for residual carcinoma (see comment)        Viktor Galarza MD, PhD         Assessment/Plan   Diagnoses and all orders for this visit:  Invasive ductal carcinoma of breast, left    85-year-old female status post left breast Magseed localization lumpectomy on June 17, 2025.  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  1.7 cm diameter.  Grade 2.  ER 61 to 70%.  [place] 71 to 80%.  HER2 negative.    The superior margin was reexcised on July 24, 2025 and was negative for residual carcinoma.  Recovering well.  Follow-up in 1 month.  She has appointments to see Dr. Shubham Arteaga from radiation oncology on August 19 and Dr. Todd Benson from medical oncology on August 28.      Jaylen Triana MD         [1]   Family History  Problem Relation Name Age of Onset    Hypertension Other MULTIPLE FAMILY MEMBERS     Cancer Other MULTIPLE FAMILY MEMBERS      "

## 2025-08-08 ENCOUNTER — OFFICE VISIT (OUTPATIENT)
Dept: SURGERY | Facility: CLINIC | Age: 85
End: 2025-08-08
Payer: MEDICARE

## 2025-08-08 VITALS
RESPIRATION RATE: 18 BRPM | TEMPERATURE: 97.1 F | SYSTOLIC BLOOD PRESSURE: 160 MMHG | DIASTOLIC BLOOD PRESSURE: 79 MMHG | BODY MASS INDEX: 21.69 KG/M2 | HEART RATE: 63 BPM | HEIGHT: 66 IN | WEIGHT: 135 LBS | OXYGEN SATURATION: 98 %

## 2025-08-08 DIAGNOSIS — C50.912 INVASIVE DUCTAL CARCINOMA OF BREAST, LEFT: Primary | ICD-10-CM

## 2025-08-08 PROCEDURE — 1126F AMNT PAIN NOTED NONE PRSNT: CPT | Performed by: SURGERY

## 2025-08-08 PROCEDURE — 3077F SYST BP >= 140 MM HG: CPT | Performed by: SURGERY

## 2025-08-08 PROCEDURE — 1036F TOBACCO NON-USER: CPT | Performed by: SURGERY

## 2025-08-08 PROCEDURE — 99211 OFF/OP EST MAY X REQ PHY/QHP: CPT | Performed by: SURGERY

## 2025-08-08 PROCEDURE — 3078F DIAST BP <80 MM HG: CPT | Performed by: SURGERY

## 2025-08-08 PROCEDURE — 1159F MED LIST DOCD IN RCRD: CPT | Performed by: SURGERY

## 2025-08-08 ASSESSMENT — ENCOUNTER SYMPTOMS
OCCASIONAL FEELINGS OF UNSTEADINESS: 1
DEPRESSION: 0
LOSS OF SENSATION IN FEET: 0

## 2025-08-08 ASSESSMENT — PAIN SCALES - GENERAL: PAINLEVEL_OUTOF10: 0-NO PAIN

## 2025-08-08 NOTE — LETTER
August 8, 2025     Alvin Langston DO  [address]  [address]  [facility] [address]    Patient: Giulia Dai   YOB: 1940   Date of Visit: 8/8/2025       Dear Dr. Alvin Langston DO:    Thank you for referring Giulia Dai to me for evaluation. Below are my notes for this consultation.  If you have questions, please do not hesitate to call me. I look forward to following your patient along with you.       Sincerely,     Jaylen Triana MD      CC: No Recipients  ______________________________________________________________________________________    History Of Present Illness  HPI     May 9, 2025  The patient is an 84-year-old female who had a recent abnormal left breast screening mammogram followed by abnormal diagnostic mammogram/ultrasound.  She is scheduled for a left breast ultrasound-guided core biopsy.  She has not felt any breast lumps and has no breast pain.  No nipple discharge or retraction.  She reports having a right breast biopsy 2 years ago which was benign.  No family history of breast cancer or any type of cancer.  Age of menarche was 13.  G1, P1 with age at first childbirth of 25.  Menopause at age 42.  The patient's  is in hospice for pancreatic cancer.     August 8, 2025  85-year-old female status post left breast Magseed localization lumpectomy on June 17, 2025.  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  1.7 cm diameter.  Grade 2.  ER 61 to 70%.  [place] 71 to 80%.  HER2 negative.  Invasive carcinoma with predominantly lobular features focally present at the new inked superior margin.  Other margins are negative for invasive carcinoma.  She follows up after having reexcision of the superior margin on July 24, 2025.  I reexcised the entire superior half of the prior lumpectomy margin.  No complaints regarding the operation.     Past medical history:  Hypertension  Chronic kidney disease  Paroxysmal atrial fibrillation on  "Xarelto.  Follows with Dr. Morrow.  Hypothyroid    Past Medical History  She has a past medical history of Old myocardial infarction, Pain in unspecified knee, Personal history of other diseases of the circulatory system, and Personal history of urinary (tract) infections.    She has no past medical history of Personal history of irradiation.    Surgical History  She has a past surgical history that includes Breast biopsy (2021) and Breast lumpectomy (Left, 06/17/2025).     Allergies  Monosodium glutamate    Social History  She reports that she has never smoked. She has never used smokeless tobacco. She reports that she does not drink alcohol and does not use drugs.    Family History  Family History[1]    Last Recorded Vitals  Blood pressure 160/79, pulse 63, temperature 36.2 °C (97.1 °F), resp. rate 18, height 1.676 m (5' 6\"), weight 61.2 kg (135 lb), SpO2 98%.    Physical Exam  General: Well-developed, well-nourished, no acute distress, alert and oriented  Wound: Intact, no erythema or signs of infection.  Seroma present.  Examination chaperoned by Rosie.    Relevant Results  Surgical Pathology Exam: [id number]  Order: [id number]   Collected 7/24/2025 13:47       Status: Final result       Dx: Invasive ductal carcinoma of breast, ...    Test Result Released: Yes (seen)    0 Result Notes      Component    FINAL DIAGNOSIS   A.  Left breast superior margin, excision:    -- Breast tissue with surgical site changes, negative for residual carcinoma (see comment)        Viktor Galarza MD, PhD         Assessment/Plan  Diagnoses and all orders for this visit:  Invasive ductal carcinoma of breast, left    85-year-old female status post left breast Magseed localization lumpectomy on June 17, 2025.  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  1.7 cm diameter.  Grade 2.  ER 61 to 70%.  [place] 71 to 80%.  HER2 negative.    The superior margin was reexcised on July 24, 2025 and was negative for residual " carcinoma.  Recovering well.  Follow-up in 1 month.  She has appointments to see Dr. Shubham Arteaga from radiation oncology on August 19 and Dr. Todd Benson from medical oncology on August 28.      Jaylen Triana MD         [1]  Family History  Problem Relation Name Age of Onset   • Hypertension Other MULTIPLE FAMILY MEMBERS    • Cancer Other MULTIPLE FAMILY MEMBERS         [1]  Family History  Problem Relation Name Age of Onset   • Hypertension Other MULTIPLE FAMILY MEMBERS    • Cancer Other MULTIPLE FAMILY MEMBERS

## 2025-08-11 ENCOUNTER — APPOINTMENT (OUTPATIENT)
Dept: HEMATOLOGY/ONCOLOGY | Facility: HOSPITAL | Age: 85
End: 2025-08-11
Payer: MEDICARE

## 2025-08-19 ENCOUNTER — HOSPITAL ENCOUNTER (OUTPATIENT)
Dept: RADIATION ONCOLOGY | Facility: CLINIC | Age: 85
Setting detail: RADIATION/ONCOLOGY SERIES
Discharge: HOME | End: 2025-08-19
Payer: MEDICARE

## 2025-08-19 VITALS
DIASTOLIC BLOOD PRESSURE: 78 MMHG | OXYGEN SATURATION: 99 % | SYSTOLIC BLOOD PRESSURE: 116 MMHG | TEMPERATURE: 97 F | RESPIRATION RATE: 18 BRPM | WEIGHT: 135.8 LBS | HEART RATE: 67 BPM | BODY MASS INDEX: 21.92 KG/M2

## 2025-08-19 DIAGNOSIS — C50.912 INVASIVE DUCTAL CARCINOMA OF BREAST, LEFT: Primary | ICD-10-CM

## 2025-08-19 PROCEDURE — 99215 OFFICE O/P EST HI 40 MIN: CPT | Performed by: STUDENT IN AN ORGANIZED HEALTH CARE EDUCATION/TRAINING PROGRAM

## 2025-08-19 PROCEDURE — 99205 OFFICE O/P NEW HI 60 MIN: CPT | Performed by: STUDENT IN AN ORGANIZED HEALTH CARE EDUCATION/TRAINING PROGRAM

## 2025-08-19 ASSESSMENT — PATIENT HEALTH QUESTIONNAIRE - PHQ9
SUM OF ALL RESPONSES TO PHQ9 QUESTIONS 1 AND 2: 0
2. FEELING DOWN, DEPRESSED OR HOPELESS: NOT AT ALL
1. LITTLE INTEREST OR PLEASURE IN DOING THINGS: NOT AT ALL

## 2025-08-19 ASSESSMENT — ENCOUNTER SYMPTOMS
HEMATOLOGIC/LYMPHATIC NEGATIVE: 1
EYES NEGATIVE: 1
NEUROLOGICAL NEGATIVE: 1
CONSTITUTIONAL NEGATIVE: 1
GASTROINTESTINAL NEGATIVE: 1
PSYCHIATRIC NEGATIVE: 1
ARTHRALGIAS: 1
RESPIRATORY NEGATIVE: 1
ENDOCRINE NEGATIVE: 1
CARDIOVASCULAR NEGATIVE: 1

## 2025-08-19 ASSESSMENT — PAIN SCALES - GENERAL: PAINLEVEL_OUTOF10: 0-NO PAIN

## 2025-08-21 ENCOUNTER — TUMOR BOARD CONFERENCE (OUTPATIENT)
Dept: HEMATOLOGY/ONCOLOGY | Facility: HOSPITAL | Age: 85
End: 2025-08-21
Payer: MEDICARE

## 2025-08-22 DIAGNOSIS — E03.9 HYPOTHYROIDISM, UNSPECIFIED: ICD-10-CM

## 2025-08-25 RX ORDER — LEVOTHYROXINE SODIUM 25 UG/1
25 TABLET ORAL DAILY
Qty: 90 TABLET | Refills: 2 | Status: SHIPPED | OUTPATIENT
Start: 2025-08-25

## 2025-08-27 ENCOUNTER — PATIENT OUTREACH (OUTPATIENT)
Dept: HEMATOLOGY/ONCOLOGY | Facility: CLINIC | Age: 85
End: 2025-08-27
Payer: MEDICARE

## 2025-08-28 ENCOUNTER — OFFICE VISIT (OUTPATIENT)
Dept: HEMATOLOGY/ONCOLOGY | Facility: CLINIC | Age: 85
End: 2025-08-28
Payer: MEDICARE

## 2025-08-28 VITALS
BODY MASS INDEX: 21.75 KG/M2 | OXYGEN SATURATION: 97 % | DIASTOLIC BLOOD PRESSURE: 81 MMHG | HEART RATE: 70 BPM | HEIGHT: 66 IN | RESPIRATION RATE: 20 BRPM | SYSTOLIC BLOOD PRESSURE: 146 MMHG | TEMPERATURE: 97.9 F | WEIGHT: 135.36 LBS

## 2025-08-28 DIAGNOSIS — C50.912 INVASIVE DUCTAL CARCINOMA OF BREAST, LEFT: Primary | ICD-10-CM

## 2025-08-28 PROCEDURE — 1126F AMNT PAIN NOTED NONE PRSNT: CPT | Performed by: INTERNAL MEDICINE

## 2025-08-28 PROCEDURE — 99205 OFFICE O/P NEW HI 60 MIN: CPT | Performed by: INTERNAL MEDICINE

## 2025-08-28 PROCEDURE — 3077F SYST BP >= 140 MM HG: CPT | Performed by: INTERNAL MEDICINE

## 2025-08-28 PROCEDURE — 1159F MED LIST DOCD IN RCRD: CPT | Performed by: INTERNAL MEDICINE

## 2025-08-28 PROCEDURE — 99215 OFFICE O/P EST HI 40 MIN: CPT | Performed by: INTERNAL MEDICINE

## 2025-08-28 PROCEDURE — 3079F DIAST BP 80-89 MM HG: CPT | Performed by: INTERNAL MEDICINE

## 2025-08-28 RX ORDER — ANASTROZOLE 1 MG/1
1 TABLET ORAL DAILY
Qty: 90 TABLET | Refills: 5 | Status: SHIPPED | OUTPATIENT
Start: 2025-08-28 | End: 2025-11-26

## 2025-08-28 ASSESSMENT — PATIENT HEALTH QUESTIONNAIRE - PHQ9
2. FEELING DOWN, DEPRESSED OR HOPELESS: NOT AT ALL
SUM OF ALL RESPONSES TO PHQ9 QUESTIONS 1 AND 2: 0
1. LITTLE INTEREST OR PLEASURE IN DOING THINGS: NOT AT ALL

## 2025-08-28 ASSESSMENT — ENCOUNTER SYMPTOMS
DEPRESSION: 0
LOSS OF SENSATION IN FEET: 0
OCCASIONAL FEELINGS OF UNSTEADINESS: 0

## 2025-08-28 ASSESSMENT — COLUMBIA-SUICIDE SEVERITY RATING SCALE - C-SSRS
6. HAVE YOU EVER DONE ANYTHING, STARTED TO DO ANYTHING, OR PREPARED TO DO ANYTHING TO END YOUR LIFE?: NO
1. IN THE PAST MONTH, HAVE YOU WISHED YOU WERE DEAD OR WISHED YOU COULD GO TO SLEEP AND NOT WAKE UP?: NO
2. HAVE YOU ACTUALLY HAD ANY THOUGHTS OF KILLING YOURSELF?: NO

## 2025-08-28 ASSESSMENT — PAIN SCALES - GENERAL: PAINLEVEL_OUTOF10: 0-NO PAIN

## 2025-09-29 ENCOUNTER — APPOINTMENT (OUTPATIENT)
Dept: PRIMARY CARE | Facility: CLINIC | Age: 85
End: 2025-09-29
Payer: MEDICARE

## 2025-11-20 ENCOUNTER — APPOINTMENT (OUTPATIENT)
Dept: CARDIOLOGY | Facility: CLINIC | Age: 85
End: 2025-11-20
Payer: MEDICARE

## (undated) DEVICE — KIT, MARGINMARKER, 6 INK COLORS, STANDARD

## (undated) DEVICE — SLEEVE, VASO PRESS, CALF GARMENT, MEDIUM, GREEN

## (undated) DEVICE — Device

## (undated) DEVICE — CLIP, LIGATING, LIGACLIP EXTRA, MEDIUM, TITANIUM, WHITE

## (undated) DEVICE — DRAPE, SHEET, THREE QUARTER, FAN FOLD, 57 X 77 IN

## (undated) DEVICE — PROBE COVER, INTRAOPERATIVE, 13 X 244CM (5 X 96IN)

## (undated) DEVICE — DRESSING, GAUZE, SUPER FLUFF, 7.75 X 8.75 IN, STERILE